# Patient Record
Sex: FEMALE | Race: BLACK OR AFRICAN AMERICAN | Employment: UNEMPLOYED | URBAN - METROPOLITAN AREA
[De-identification: names, ages, dates, MRNs, and addresses within clinical notes are randomized per-mention and may not be internally consistent; named-entity substitution may affect disease eponyms.]

---

## 2018-11-14 ENCOUNTER — HOSPITAL ENCOUNTER (EMERGENCY)
Age: 43
Discharge: HOME | End: 2018-11-14
Attending: EMERGENCY MEDICINE
Payer: MEDICARE

## 2018-11-14 VITALS
WEIGHT: 204.9 LBS | TEMPERATURE: 98.3 F | BODY MASS INDEX: 31.05 KG/M2 | SYSTOLIC BLOOD PRESSURE: 143 MMHG | RESPIRATION RATE: 18 BRPM | DIASTOLIC BLOOD PRESSURE: 89 MMHG | HEART RATE: 94 BPM | HEIGHT: 68 IN | OXYGEN SATURATION: 100 %

## 2018-11-14 DIAGNOSIS — N76.0 BACTERIAL VAGINOSIS: Primary | ICD-10-CM

## 2018-11-14 DIAGNOSIS — B96.89 BACTERIAL VAGINOSIS: Primary | ICD-10-CM

## 2018-11-14 LAB
BILIRUBIN URINE: NEGATIVE
CHP ED QC CHECK: NORMAL
CHP ED QC CHECK: NORMAL
COLOR: YELLOW
COMMENT UA: ABNORMAL
DIRECT EXAM: ABNORMAL
GLUCOSE URINE: NEGATIVE
KETONES, URINE: NEGATIVE
LEUKOCYTE ESTERASE, URINE: NEGATIVE
Lab: ABNORMAL
NITRITE, URINE: NEGATIVE
PH UA: 6.5 (ref 5–8)
PREGNANCY TEST URINE, POC: NEGATIVE
PROTEIN UA: NEGATIVE
SPECIFIC GRAVITY UA: 1 (ref 1–1.03)
SPECIMEN DESCRIPTION: ABNORMAL
STATUS: ABNORMAL
TURBIDITY: CLEAR
URINE HGB: NEGATIVE
UROBILINOGEN, URINE: NORMAL

## 2018-11-14 PROCEDURE — 87660 TRICHOMONAS VAGIN DIR PROBE: CPT

## 2018-11-14 PROCEDURE — 87491 CHLMYD TRACH DNA AMP PROBE: CPT

## 2018-11-14 PROCEDURE — 87591 N.GONORRHOEAE DNA AMP PROB: CPT

## 2018-11-14 PROCEDURE — 99283 EMERGENCY DEPT VISIT LOW MDM: CPT

## 2018-11-14 PROCEDURE — 87480 CANDIDA DNA DIR PROBE: CPT

## 2018-11-14 PROCEDURE — 84703 CHORIONIC GONADOTROPIN ASSAY: CPT

## 2018-11-14 PROCEDURE — 87510 GARDNER VAG DNA DIR PROBE: CPT

## 2018-11-14 PROCEDURE — 81003 URINALYSIS AUTO W/O SCOPE: CPT

## 2018-11-14 RX ORDER — METRONIDAZOLE 500 MG/1
500 TABLET ORAL 2 TIMES DAILY
Qty: 14 TABLET | Refills: 0 | Status: SHIPPED | OUTPATIENT
Start: 2018-11-14 | End: 2018-11-21

## 2018-11-14 RX ORDER — CLONAZEPAM 1 MG/1
1 TABLET ORAL 2 TIMES DAILY PRN
COMMUNITY
Start: 2016-07-01

## 2018-11-14 NOTE — ED NOTES
Pt presents to ED ambulatory with steady gait c/o of pelvic pain for a couple of weeks and vaginal discharge for roughly a week. Pt states discharge is yellow and there is an odor. Pt denies any urinary symptoms, but states she was treated for a UTI and yeast infection prior to pain and discharge starting. Pt denies fever or chills. Pt denies n/v/d/c.       Mignon Ruiz RN  11/14/18 0508

## 2018-11-14 NOTE — ED PROVIDER NOTES
not use drugs. REVIEW OF SYSTEMS    (2-9 systems for level 4, 10 or more for level 5)     REVIEW OF SYSTEMS    Constitutional:  Denies fever, chills, or weakness   HENT:  Denies sore throat or neck pain   Respiratory:  Denies cough or shortness of breath   Cardiovascular:  Denies chest pain, palpitations  GI:  Denies abdominal pain, nausea, vomiting, diarrhea, melena, hematochezia  : Denies urinary frequency, urgency, dysuria, hematuria  GYN: Complains of vaginal discharge. Denies Vaginal bleeding   Musculoskeletal:  Denies back pain   Skin:  Denies rash  : All systems negative except as marked. Except as noted above the remainder of the review of systems was reviewed and negative. PHYSICAL EXAM    (up to 7 for level 4, 8 or more for level 5)     ED Triage Vitals [11/14/18 1456]   BP Temp Temp Source Pulse Resp SpO2 Height Weight   (!) 143/89 98.3 °F (36.8 °C) Oral 94 18 100 % 5' 8\" (1.727 m) 204 lb 14.4 oz (92.9 kg)       General Appearance:  Alert, cooperative, no distress, Pleasant, smiling    Head:  Normocephalic, without obvious abnormality, atraumatic. Eyes:  conjunctiva/corneas clear, EOM's intact. Sclera anicteric. ENT: Mucous membranes moist.    Neck: Supple, symmetrical, trachea midline, no adenopathy. Lungs:    Heart:   Abdomen:    Gynecological:          Extremities:   Pulses:   Skin:   Neurologic:    Respirations eupneic. Lungs CTA  RRR  Soft, nontender  Examination of the external genitalia reveals no rash or lesions. Speculum exam reveals a mild amount of white vaginal discharge. Vaginal rugae pink. No adnexal or cervical motion tenderness. full range of motion. No distal swelling   Radial/ulnar pulses 3+. DP/PT pulses 3+/4   No rashes or lesions to exposed skin. Alert and oriented X 3. Motor grossly normal.  Speech clear.              DIAGNOSTIC RESULTS       RADIOLOGY:   Non-plain film images such as CT, Ultrasound and MRI are read by the radiologist. Estefania Donohue

## 2018-11-15 LAB
C TRACH DNA GENITAL QL NAA+PROBE: NEGATIVE
HCG, PREGNANCY URINE (POC): NEGATIVE
N. GONORRHOEAE DNA: NEGATIVE

## 2019-05-28 ENCOUNTER — APPOINTMENT (OUTPATIENT)
Dept: GENERAL RADIOLOGY | Age: 44
End: 2019-05-28
Payer: MEDICARE

## 2019-05-28 ENCOUNTER — HOSPITAL ENCOUNTER (EMERGENCY)
Age: 44
Discharge: HOME | End: 2019-05-29
Attending: EMERGENCY MEDICINE
Payer: MEDICARE

## 2019-05-28 VITALS
TEMPERATURE: 97.9 F | SYSTOLIC BLOOD PRESSURE: 137 MMHG | DIASTOLIC BLOOD PRESSURE: 86 MMHG | RESPIRATION RATE: 16 BRPM | HEART RATE: 109 BPM | OXYGEN SATURATION: 98 %

## 2019-05-28 DIAGNOSIS — G89.29 CHRONIC PAIN OF RIGHT KNEE: Primary | ICD-10-CM

## 2019-05-28 DIAGNOSIS — M25.561 CHRONIC PAIN OF RIGHT KNEE: Primary | ICD-10-CM

## 2019-05-28 PROCEDURE — 73562 X-RAY EXAM OF KNEE 3: CPT

## 2019-05-28 PROCEDURE — 99283 EMERGENCY DEPT VISIT LOW MDM: CPT

## 2019-05-28 PROCEDURE — 6370000000 HC RX 637 (ALT 250 FOR IP): Performed by: EMERGENCY MEDICINE

## 2019-05-28 RX ORDER — OXYCODONE AND ACETAMINOPHEN 10; 325 MG/1; MG/1
1 TABLET ORAL EVERY 4 HOURS PRN
COMMUNITY
End: 2019-05-29 | Stop reason: ALTCHOICE

## 2019-05-28 RX ORDER — NAPROXEN 250 MG/1
250 TABLET ORAL ONCE
Status: COMPLETED | OUTPATIENT
Start: 2019-05-28 | End: 2019-05-28

## 2019-05-28 RX ORDER — HYDROCODONE BITARTRATE AND ACETAMINOPHEN 5; 325 MG/1; MG/1
1 TABLET ORAL ONCE
Status: COMPLETED | OUTPATIENT
Start: 2019-05-28 | End: 2019-05-28

## 2019-05-28 RX ADMIN — NAPROXEN 250 MG: 250 TABLET ORAL at 23:47

## 2019-05-28 RX ADMIN — HYDROCODONE BITARTRATE AND ACETAMINOPHEN 1 TABLET: 5; 325 TABLET ORAL at 23:47

## 2019-05-28 ASSESSMENT — PAIN SCALES - GENERAL: PAINLEVEL_OUTOF10: 7

## 2019-05-29 RX ORDER — OXYCODONE HYDROCHLORIDE AND ACETAMINOPHEN 5; 325 MG/1; MG/1
1 TABLET ORAL EVERY 6 HOURS PRN
Qty: 7 TABLET | Refills: 0 | Status: SHIPPED | OUTPATIENT
Start: 2019-05-29 | End: 2019-06-01

## 2019-05-29 ASSESSMENT — PAIN SCALES - GENERAL: PAINLEVEL_OUTOF10: 5

## 2019-05-29 NOTE — ED NOTES
Patient comes in from home with right knee pain. Patient states that in October of last year she had injured her knee and but had no other problems until yesterday. Patient has small amount of swelling to right knee. Patient walks to room and is alert and oriented and denies any other complaints at this time.      Marcus Hewitt RN  05/28/19 2756

## 2019-06-02 NOTE — ED PROVIDER NOTES
EMERGENCY DEPARTMENT ENCOUNTER    Pt Name: Everardo Whitman  MRN: [id number]  [place] 1975  Date of evaluation: 6/2/19  CHIEF COMPLAINT       Chief Complaint   Patient presents with    Knee Pain     right knee     HISTORY OF PRESENT ILLNESS   HPI    82-year-old female presenting ED for evaluation of right knee pain. Patient with chronic knee problems since October when she shut a car door on her right knee. Patient here for worsening pain Renée ongoing use with the past 3 weeks. Patient states that this is may be a flare since she hasn't had any new trauma or injury. Patient also has a history of sickle cell anemia but denied any fever chills. Patient doesn't usually get her sickle cell crisis at the knee. Patient denies abdominal pain nausea vomiting. Patient with no dysuria hematuria    REVIEW OF SYSTEMS     Review of Systems   All other systems reviewed and are negative. PASTMEDICAL HISTORY     Past Medical History:   Diagnosis Date    Anxiety     Sickle cell anemia ([facility] [address]      SURGICAL HISTORY       Past Surgical History:   Procedure Laterality Date    CHOLECYSTECTOMY       CURRENT MEDICATIONS       Discharge Medication List as of 5/29/2019 12:48 AM      CONTINUE these medications which have NOT CHANGED    Details   clonazePAM (KLONOPIN) 1 MG tablet Take 1 tablet by mouth 2 times daily. Yashira Kwon Historical Med           ALLERGIES     is allergic to lorazepam and iodides. FAMILY HISTORY     has no family status information on file.       SOCIAL HISTORY       Social History     Tobacco Use    Smoking status: Current Every Day Smoker     Packs/day: 0.50     Types: Cigarettes    Smokeless tobacco: Never Used   Substance Use Topics    Alcohol use: Yes     Comment: occassionally    Drug use: No     PHYSICAL EXAM     INITIAL VITALS:   Vitals:    05/28/19 2119   BP: 137/86   Pulse: 109   Resp: 16   Temp: 97.9 °F (36.6 °C)   TempSrc: Oral   SpO2: 98%       Physical Exam   Constitutional: She is oriented to person, place, and time. She appears well-developed and well-nourished. HENT:   Head: Normocephalic and atraumatic. Eyes: Conjunctivae and EOM are normal.   Neck: Normal range of motion. Neck supple. Cardiovascular: Normal rate and regular rhythm. Pulmonary/Chest: Effort normal and breath sounds normal.   Abdominal: Soft. She exhibits no mass. There is no tenderness. Musculoskeletal: Normal range of motion. She exhibits no edema or deformity. Right knee: She exhibits bony tenderness. She exhibits normal range of motion and no MCL laxity. Tenderness found. Lateral joint line and patellar tendon tenderness noted. Left knee: Normal.        Right ankle: Normal.   Neurological: She is alert and oriented to person, place, and time. Skin: Skin is warm and dry. MEDICAL DECISION MAKING:     Exacerbation of chronic right knee pain. CRITICAL CARE:       PROCEDURES:    Procedures    DIAGNOSTIC RESULTS   EKG:All EKG's are interpreted by the Emergency Department Physician who either signs or Co-signs this chart in the absence of a cardiologist.        RADIOLOGY:All plain film, CT, MRI, and formal ultrasound images (except ED bedside ultrasound) are read by the radiologist, see reports below, unless otherwisenoted in MDM or here. XR KNEE RIGHT (3 VIEWS)   Final Result   1. No acute osseous abnormality. 2. Nonspecific sclerotic lesions in the distal femur and proximal tibia   possibly representing avascular necrosis. Consider further evaluation with   nonemergent CT or MRI. LABS: All lab results were reviewed by myself, and all abnormals are listed below.   Labs Reviewed - No data to display    EMERGENCY DEPARTMENTCOURSE:         Vitals:    Vitals:    05/28/19 2119   BP: 137/86   Pulse: 109   Resp: 16   Temp: 97.9 °F (36.6 °C)   TempSrc: Oral   SpO2: 98%       The patient was given the following medications while in the emergency department:  Orders Placed This Encounter Medications    HYDROcodone-acetaminophen (NORCO) 5-325 MG per tablet 1 tablet    naproxen (NAPROSYN) tablet 250 mg    oxyCODONE-acetaminophen (PERCOCET) 5-325 MG per tablet     Sig: Take 1 tablet by mouth every 6 hours as needed for Pain for up to 3 days. Intended supply: 3 days. Take lowest dose possible to manage pain     Dispense:  7 tablet     Refill:  0     CONSULTS:  None    FINAL IMPRESSION      1. Chronic pain of right knee          DISPOSITION/PLAN   DISPOSITION Decision To Discharge 05/29/2019 12:46:04 AM      PATIENT REFERRED TO:  Ran Huizar MD  [address] [facility] [address] [address] [phone]    Schedule an appointment as soon as possible for a visit in 3 days      Suzanna Oquendo, [address] [place], [place] [address]Chandu Delga[address] [facility]  [phone]    Schedule an appointment as soon as possible for a visit   As needed    DISCHARGE MEDICATIONS:  Discharge Medication List as of 5/29/2019 12:48 AM      START taking these medications    Details   oxyCODONE-acetaminophen (PERCOCET) 5-325 MG per tablet Take 1 tablet by mouth every 6 hours as needed for Pain for up to 3 days. Intended supply: 3 days.  Take lowest dose possible to manage pain, Disp-7 tablet, R-0Print           Shagufta Parks MD  Attending Emergency Physician                    Annie Swanson MD  06/02/19 8445

## 2021-10-13 ENCOUNTER — APPOINTMENT (OUTPATIENT)
Dept: GENERAL RADIOLOGY | Facility: CLINIC | Age: 46
DRG: 662 | End: 2021-10-13
Payer: MEDICARE

## 2021-10-13 ENCOUNTER — APPOINTMENT (OUTPATIENT)
Dept: CT IMAGING | Facility: CLINIC | Age: 46
DRG: 662 | End: 2021-10-13
Payer: MEDICARE

## 2021-10-13 ENCOUNTER — HOSPITAL ENCOUNTER (INPATIENT)
Age: 46
LOS: 3 days | Discharge: ANOTHER HOSPITAL | DRG: 662 | End: 2021-10-16
Attending: EMERGENCY MEDICINE | Admitting: INTERNAL MEDICINE
Payer: MEDICARE

## 2021-10-13 DIAGNOSIS — J18.9 PNEUMONIA OF BOTH LUNGS DUE TO INFECTIOUS ORGANISM, UNSPECIFIED PART OF LUNG: ICD-10-CM

## 2021-10-13 DIAGNOSIS — D64.9 ANEMIA, UNSPECIFIED TYPE: ICD-10-CM

## 2021-10-13 DIAGNOSIS — D57.00 SICKLE CELL CRISIS (HCC): Primary | ICD-10-CM

## 2021-10-13 LAB
ABSOLUTE EOS #: 0 K/UL (ref 0–0.4)
ABSOLUTE EOS #: 0 K/UL (ref 0–0.4)
ABSOLUTE IMMATURE GRANULOCYTE: 0 K/UL (ref 0–0.3)
ABSOLUTE IMMATURE GRANULOCYTE: ABNORMAL K/UL (ref 0–0.3)
ABSOLUTE LYMPH #: 1.46 K/UL (ref 1–4.8)
ABSOLUTE LYMPH #: 1.86 K/UL (ref 1–4.8)
ABSOLUTE MONO #: 0.42 K/UL (ref 0.2–0.8)
ABSOLUTE MONO #: 4.51 K/UL (ref 0.1–0.8)
ABSOLUTE RETIC #: 0.11 M/UL (ref 0.02–0.1)
ALBUMIN SERPL-MCNC: 3.6 G/DL (ref 3.5–5.2)
ALBUMIN/GLOBULIN RATIO: 0.9 (ref 1–2.5)
ALP BLD-CCNC: 134 U/L (ref 35–104)
ALT SERPL-CCNC: 43 U/L (ref 5–33)
AMYLASE: 136 U/L (ref 28–100)
ANION GAP SERPL CALCULATED.3IONS-SCNC: 12 MMOL/L (ref 9–17)
ANION GAP SERPL CALCULATED.3IONS-SCNC: 13 MMOL/L (ref 9–17)
AST SERPL-CCNC: 64 U/L
BASOPHILS # BLD: 0 %
BASOPHILS # BLD: 0 % (ref 0–2)
BASOPHILS ABSOLUTE: 0 K/UL (ref 0–0.2)
BASOPHILS ABSOLUTE: 0 K/UL (ref 0–0.2)
BILIRUB SERPL-MCNC: 7.1 MG/DL (ref 0.3–1.2)
BILIRUBIN DIRECT: 4.26 MG/DL
BILIRUBIN, INDIRECT: 2.84 MG/DL (ref 0–1)
BUN BLDV-MCNC: 20 MG/DL (ref 6–20)
BUN/CREAT BLD: ABNORMAL (ref 9–20)
CALCIUM SERPL-MCNC: 8.6 MG/DL (ref 8.6–10.4)
CHLORIDE BLD-SCNC: 90 MMOL/L (ref 98–107)
CHLORIDE BLD-SCNC: 91 MMOL/L (ref 98–107)
CO2: 22 MMOL/L (ref 20–31)
CO2: 23 MMOL/L (ref 20–31)
CREAT SERPL-MCNC: 0.5 MG/DL (ref 0.5–0.9)
DIFFERENTIAL TYPE: ABNORMAL
DIFFERENTIAL TYPE: ABNORMAL
EOSINOPHILS RELATIVE PERCENT: 0 % (ref 1–4)
EOSINOPHILS RELATIVE PERCENT: 0 % (ref 1–4)
GFR AFRICAN AMERICAN: >60 ML/MIN
GFR NON-AFRICAN AMERICAN: >60 ML/MIN
GFR SERPL CREATININE-BSD FRML MDRD: ABNORMAL ML/MIN/{1.73_M2}
GFR SERPL CREATININE-BSD FRML MDRD: ABNORMAL ML/MIN/{1.73_M2}
GLOBULIN: ABNORMAL G/DL (ref 1.5–3.8)
GLUCOSE BLD-MCNC: 108 MG/DL (ref 70–99)
HCG QUALITATIVE: NEGATIVE
HCT VFR BLD CALC: 18.7 % (ref 36.3–47.1)
HCT VFR BLD CALC: 21.1 % (ref 36–46)
HEMOGLOBIN: 6.6 G/DL (ref 11.9–15.1)
HEMOGLOBIN: 7.2 G/DL (ref 12–16)
IMMATURE GRANULOCYTES: 0 %
IMMATURE GRANULOCYTES: ABNORMAL %
IMMATURE RETIC FRACT: ABNORMAL %
LACTIC ACID, SEPSIS WHOLE BLOOD: NORMAL MMOL/L (ref 0.5–1.9)
LACTIC ACID, SEPSIS: 1.6 MMOL/L (ref 0.5–1.9)
LIPASE: 52 U/L (ref 13–60)
LYMPHOCYTES # BLD: 14 % (ref 24–44)
LYMPHOCYTES # BLD: 7 % (ref 24–44)
MCH RBC QN AUTO: 29.3 PG (ref 25.2–33.5)
MCH RBC QN AUTO: 29.7 PG (ref 26–34)
MCHC RBC AUTO-ENTMCNC: 34 G/DL (ref 31–37)
MCHC RBC AUTO-ENTMCNC: 35.3 G/DL (ref 28.4–34.8)
MCV RBC AUTO: 83.1 FL (ref 82.6–102.9)
MCV RBC AUTO: 87.6 FL (ref 80–100)
METAMYELOCYTES ABSOLUTE COUNT: 0.1 K/UL
METAMYELOCYTES ABSOLUTE COUNT: 0.8 K/UL
METAMYELOCYTES: 1 %
METAMYELOCYTES: 3 %
MONOCYTES # BLD: 17 % (ref 1–7)
MONOCYTES # BLD: 4 % (ref 1–7)
MORPHOLOGY: ABNORMAL
MYELOCYTES ABSOLUTE COUNT: 0.1 K/UL
MYELOCYTES: 1 %
NRBC AUTOMATED: ABNORMAL PER 100 WBC
NRBC AUTOMATED: ABNORMAL PER 100 WBC
NUCLEATED RED BLOOD CELLS: 132 PER 100 WBC
NUCLEATED RED BLOOD CELLS: 87 PER 100 WBC
PDW BLD-RTO: 19.9 % (ref 11.8–14.4)
PDW BLD-RTO: 20.6 % (ref 12.5–15.4)
PLATELET # BLD: 263 K/UL (ref 138–453)
PLATELET # BLD: 269 K/UL (ref 140–450)
PLATELET ESTIMATE: ABNORMAL
PLATELET ESTIMATE: ABNORMAL
PMV BLD AUTO: 8.1 FL (ref 6–12)
PMV BLD AUTO: ABNORMAL FL (ref 6–12)
POTASSIUM SERPL-SCNC: 3.9 MMOL/L (ref 3.7–5.3)
POTASSIUM SERPL-SCNC: 4.2 MMOL/L (ref 3.7–5.3)
RBC # BLD: 2.25 M/UL (ref 3.95–5.11)
RBC # BLD: 2.41 M/UL (ref 4–5.2)
RBC # BLD: ABNORMAL 10*6/UL
RBC # BLD: ABNORMAL 10*6/UL
RETIC %: 4.4 % (ref 0.5–2)
RETIC HEMOGLOBIN: 28.8 PG (ref 28.2–35.7)
SARS-COV-2, RAPID: NOT DETECTED
SEG NEUTROPHILS: 73 % (ref 36–66)
SEG NEUTROPHILS: 80 % (ref 36–66)
SEGMENTED NEUTROPHILS ABSOLUTE COUNT: 19.33 K/UL (ref 1.8–7.7)
SEGMENTED NEUTROPHILS ABSOLUTE COUNT: 8.32 K/UL (ref 1.8–7.7)
SODIUM BLD-SCNC: 125 MMOL/L (ref 135–144)
SODIUM BLD-SCNC: 126 MMOL/L (ref 135–144)
SPECIMEN DESCRIPTION: NORMAL
TOTAL PROTEIN: 7.4 G/DL (ref 6.4–8.3)
WBC # BLD: 10.4 K/UL (ref 3.5–11)
WBC # BLD: 26.5 K/UL (ref 3.5–11)
WBC # BLD: ABNORMAL 10*3/UL
WBC # BLD: ABNORMAL 10*3/UL

## 2021-10-13 PROCEDURE — 6360000002 HC RX W HCPCS: Performed by: INTERNAL MEDICINE

## 2021-10-13 PROCEDURE — 83605 ASSAY OF LACTIC ACID: CPT

## 2021-10-13 PROCEDURE — 6360000002 HC RX W HCPCS: Performed by: EMERGENCY MEDICINE

## 2021-10-13 PROCEDURE — 85025 COMPLETE CBC W/AUTO DIFF WBC: CPT

## 2021-10-13 PROCEDURE — 80076 HEPATIC FUNCTION PANEL: CPT

## 2021-10-13 PROCEDURE — 6370000000 HC RX 637 (ALT 250 FOR IP): Performed by: INTERNAL MEDICINE

## 2021-10-13 PROCEDURE — 86900 BLOOD TYPING SEROLOGIC ABO: CPT

## 2021-10-13 PROCEDURE — 82150 ASSAY OF AMYLASE: CPT

## 2021-10-13 PROCEDURE — 84703 CHORIONIC GONADOTROPIN ASSAY: CPT

## 2021-10-13 PROCEDURE — 85045 AUTOMATED RETICULOCYTE COUNT: CPT

## 2021-10-13 PROCEDURE — 36415 COLL VENOUS BLD VENIPUNCTURE: CPT

## 2021-10-13 PROCEDURE — 87040 BLOOD CULTURE FOR BACTERIA: CPT

## 2021-10-13 PROCEDURE — 83690 ASSAY OF LIPASE: CPT

## 2021-10-13 PROCEDURE — 71250 CT THORAX DX C-: CPT

## 2021-10-13 PROCEDURE — 86901 BLOOD TYPING SEROLOGIC RH(D): CPT

## 2021-10-13 PROCEDURE — 80048 BASIC METABOLIC PNL TOTAL CA: CPT

## 2021-10-13 PROCEDURE — 96374 THER/PROPH/DIAG INJ IV PUSH: CPT

## 2021-10-13 PROCEDURE — 2580000003 HC RX 258: Performed by: INTERNAL MEDICINE

## 2021-10-13 PROCEDURE — 80051 ELECTROLYTE PANEL: CPT

## 2021-10-13 PROCEDURE — 86920 COMPATIBILITY TEST SPIN: CPT

## 2021-10-13 PROCEDURE — 2580000003 HC RX 258: Performed by: EMERGENCY MEDICINE

## 2021-10-13 PROCEDURE — 99285 EMERGENCY DEPT VISIT HI MDM: CPT

## 2021-10-13 PROCEDURE — 87635 SARS-COV-2 COVID-19 AMP PRB: CPT

## 2021-10-13 PROCEDURE — 74176 CT ABD & PELVIS W/O CONTRAST: CPT

## 2021-10-13 PROCEDURE — 86850 RBC ANTIBODY SCREEN: CPT

## 2021-10-13 PROCEDURE — 2060000000 HC ICU INTERMEDIATE R&B

## 2021-10-13 PROCEDURE — 96361 HYDRATE IV INFUSION ADD-ON: CPT

## 2021-10-13 PROCEDURE — 96375 TX/PRO/DX INJ NEW DRUG ADDON: CPT

## 2021-10-13 RX ORDER — DIPHENHYDRAMINE HCL 50 MG
50 CAPSULE ORAL 2 TIMES DAILY PRN
Status: ON HOLD | COMMUNITY
End: 2021-10-16 | Stop reason: HOSPADM

## 2021-10-13 RX ORDER — HYDROMORPHONE HYDROCHLORIDE 1 MG/ML
2 INJECTION, SOLUTION INTRAMUSCULAR; INTRAVENOUS; SUBCUTANEOUS
Status: DISCONTINUED | OUTPATIENT
Start: 2021-10-13 | End: 2021-10-14

## 2021-10-13 RX ORDER — 0.9 % SODIUM CHLORIDE 0.9 %
1000 INTRAVENOUS SOLUTION INTRAVENOUS ONCE
Status: COMPLETED | OUTPATIENT
Start: 2021-10-13 | End: 2021-10-13

## 2021-10-13 RX ORDER — ACETAMINOPHEN 650 MG/1
650 SUPPOSITORY RECTAL EVERY 6 HOURS PRN
Status: DISCONTINUED | OUTPATIENT
Start: 2021-10-13 | End: 2021-10-17 | Stop reason: HOSPADM

## 2021-10-13 RX ORDER — ONDANSETRON 4 MG/1
4 TABLET, ORALLY DISINTEGRATING ORAL EVERY 8 HOURS PRN
Status: DISCONTINUED | OUTPATIENT
Start: 2021-10-13 | End: 2021-10-13

## 2021-10-13 RX ORDER — ONDANSETRON 4 MG/1
4 TABLET, FILM COATED ORAL EVERY 4 HOURS PRN
Status: ON HOLD | COMMUNITY
End: 2021-10-14

## 2021-10-13 RX ORDER — METHYLPREDNISOLONE SODIUM SUCCINATE 125 MG/2ML
125 INJECTION, POWDER, LYOPHILIZED, FOR SOLUTION INTRAMUSCULAR; INTRAVENOUS ONCE
Status: DISCONTINUED | OUTPATIENT
Start: 2021-10-13 | End: 2021-10-13

## 2021-10-13 RX ORDER — MORPHINE SULFATE/0.9% NACL/PF 1 MG/ML
SYRINGE (ML) INJECTION CONTINUOUS
Status: ON HOLD | COMMUNITY
End: 2021-10-14

## 2021-10-13 RX ORDER — ONDANSETRON 2 MG/ML
4 INJECTION INTRAMUSCULAR; INTRAVENOUS EVERY 6 HOURS PRN
Status: DISCONTINUED | OUTPATIENT
Start: 2021-10-13 | End: 2021-10-13

## 2021-10-13 RX ORDER — MORPHINE SULFATE 30 MG/1
30 TABLET ORAL 2 TIMES DAILY
Status: ON HOLD | COMMUNITY
End: 2021-10-14

## 2021-10-13 RX ORDER — ACETAMINOPHEN 325 MG/1
650 TABLET ORAL EVERY 6 HOURS PRN
Status: DISCONTINUED | OUTPATIENT
Start: 2021-10-13 | End: 2021-10-17 | Stop reason: HOSPADM

## 2021-10-13 RX ORDER — HYDROMORPHONE HYDROCHLORIDE 1 MG/ML
1 INJECTION, SOLUTION INTRAMUSCULAR; INTRAVENOUS; SUBCUTANEOUS
Status: DISCONTINUED | OUTPATIENT
Start: 2021-10-13 | End: 2021-10-13

## 2021-10-13 RX ORDER — SODIUM CHLORIDE 9 MG/ML
INJECTION, SOLUTION INTRAVENOUS PRN
Status: COMPLETED | OUTPATIENT
Start: 2021-10-13 | End: 2021-10-15

## 2021-10-13 RX ORDER — PANTOPRAZOLE SODIUM 40 MG/10ML
40 INJECTION, POWDER, LYOPHILIZED, FOR SOLUTION INTRAVENOUS DAILY
Status: DISCONTINUED | OUTPATIENT
Start: 2021-10-14 | End: 2021-10-17 | Stop reason: HOSPADM

## 2021-10-13 RX ORDER — DIPHENHYDRAMINE HYDROCHLORIDE 50 MG/ML
25 INJECTION INTRAMUSCULAR; INTRAVENOUS ONCE
Status: COMPLETED | OUTPATIENT
Start: 2021-10-13 | End: 2021-10-13

## 2021-10-13 RX ORDER — DIPHENHYDRAMINE HCL 25 MG
25 TABLET ORAL EVERY 6 HOURS PRN
Status: DISCONTINUED | OUTPATIENT
Start: 2021-10-13 | End: 2021-10-17 | Stop reason: HOSPADM

## 2021-10-13 RX ORDER — SODIUM CHLORIDE 0.9 % (FLUSH) 0.9 %
5-40 SYRINGE (ML) INJECTION EVERY 12 HOURS SCHEDULED
Status: DISCONTINUED | OUTPATIENT
Start: 2021-10-13 | End: 2021-10-17 | Stop reason: HOSPADM

## 2021-10-13 RX ORDER — OXYCODONE HCL 20 MG/1
TABLET, FILM COATED, EXTENDED RELEASE ORAL EVERY 4 HOURS
Status: ON HOLD | COMMUNITY
End: 2021-10-14

## 2021-10-13 RX ORDER — SODIUM CHLORIDE 0.9 % (FLUSH) 0.9 %
5-40 SYRINGE (ML) INJECTION PRN
Status: DISCONTINUED | OUTPATIENT
Start: 2021-10-13 | End: 2021-10-17 | Stop reason: HOSPADM

## 2021-10-13 RX ORDER — MORPHINE SULFATE 15 MG/1
30 TABLET, FILM COATED, EXTENDED RELEASE ORAL 2 TIMES DAILY
Status: DISCONTINUED | OUTPATIENT
Start: 2021-10-13 | End: 2021-10-17 | Stop reason: HOSPADM

## 2021-10-13 RX ORDER — HYDROMORPHONE HYDROCHLORIDE 1 MG/ML
1 INJECTION, SOLUTION INTRAMUSCULAR; INTRAVENOUS; SUBCUTANEOUS ONCE
Status: DISCONTINUED | OUTPATIENT
Start: 2021-10-13 | End: 2021-10-13

## 2021-10-13 RX ORDER — SODIUM CHLORIDE 9 MG/ML
25 INJECTION, SOLUTION INTRAVENOUS PRN
Status: DISCONTINUED | OUTPATIENT
Start: 2021-10-13 | End: 2021-10-17 | Stop reason: HOSPADM

## 2021-10-13 RX ORDER — ONDANSETRON 2 MG/ML
4 INJECTION INTRAMUSCULAR; INTRAVENOUS EVERY 4 HOURS PRN
Status: DISCONTINUED | OUTPATIENT
Start: 2021-10-13 | End: 2021-10-17 | Stop reason: HOSPADM

## 2021-10-13 RX ORDER — SODIUM CHLORIDE 9 MG/ML
INJECTION, SOLUTION INTRAVENOUS CONTINUOUS
Status: DISCONTINUED | OUTPATIENT
Start: 2021-10-13 | End: 2021-10-17 | Stop reason: HOSPADM

## 2021-10-13 RX ORDER — POLYETHYLENE GLYCOL 3350 17 G/17G
17 POWDER, FOR SOLUTION ORAL DAILY PRN
Status: DISCONTINUED | OUTPATIENT
Start: 2021-10-13 | End: 2021-10-17 | Stop reason: HOSPADM

## 2021-10-13 RX ORDER — HYDROMORPHONE HYDROCHLORIDE 1 MG/ML
1 INJECTION, SOLUTION INTRAMUSCULAR; INTRAVENOUS; SUBCUTANEOUS ONCE
Status: COMPLETED | OUTPATIENT
Start: 2021-10-13 | End: 2021-10-13

## 2021-10-13 RX ADMIN — CEFTRIAXONE SODIUM 1000 MG: 1 INJECTION, POWDER, FOR SOLUTION INTRAMUSCULAR; INTRAVENOUS at 16:23

## 2021-10-13 RX ADMIN — DIPHENHYDRAMINE HYDROCHLORIDE 25 MG: 25 TABLET ORAL at 21:56

## 2021-10-13 RX ADMIN — HYDROMORPHONE HYDROCHLORIDE 1 MG: 1 INJECTION, SOLUTION INTRAMUSCULAR; INTRAVENOUS; SUBCUTANEOUS at 14:04

## 2021-10-13 RX ADMIN — SODIUM CHLORIDE 1000 ML: 9 INJECTION, SOLUTION INTRAVENOUS at 13:44

## 2021-10-13 RX ADMIN — AZITHROMYCIN MONOHYDRATE 500 MG: 500 INJECTION, POWDER, LYOPHILIZED, FOR SOLUTION INTRAVENOUS at 17:16

## 2021-10-13 RX ADMIN — MORPHINE SULFATE 30 MG: 15 TABLET, FILM COATED, EXTENDED RELEASE ORAL at 21:56

## 2021-10-13 RX ADMIN — HYDROMORPHONE HYDROCHLORIDE 1 MG: 1 INJECTION, SOLUTION INTRAMUSCULAR; INTRAVENOUS; SUBCUTANEOUS at 21:56

## 2021-10-13 RX ADMIN — SODIUM CHLORIDE: 9 INJECTION, SOLUTION INTRAVENOUS at 21:13

## 2021-10-13 RX ADMIN — DIPHENHYDRAMINE HYDROCHLORIDE 25 MG: 50 INJECTION, SOLUTION INTRAMUSCULAR; INTRAVENOUS at 14:02

## 2021-10-13 RX ADMIN — HYDROMORPHONE HYDROCHLORIDE 1 MG: 1 INJECTION, SOLUTION INTRAMUSCULAR; INTRAVENOUS; SUBCUTANEOUS at 17:16

## 2021-10-13 RX ADMIN — HYDROMORPHONE HYDROCHLORIDE 1 MG: 1 INJECTION, SOLUTION INTRAMUSCULAR; INTRAVENOUS; SUBCUTANEOUS at 21:07

## 2021-10-13 RX ADMIN — ONDANSETRON 4 MG: 2 INJECTION INTRAMUSCULAR; INTRAVENOUS at 21:57

## 2021-10-13 ASSESSMENT — PAIN SCALES - GENERAL
PAINLEVEL_OUTOF10: 10

## 2021-10-13 NOTE — ED NOTES
PT taking off oxygen again 02 sat 88% room air/ pt encouraged to leave nasal cannula on .  Pt is alert and oriented x 68 Crystal Lira RN  10/13/21 9349

## 2021-10-13 NOTE — ED NOTES
Ice pack applied to left upper arm / swelling / edema noted. . Pt removed nasal cannula .  Encouraged pt to remain on oxygen      Wong Dumont RN  10/13/21 7766

## 2021-10-13 NOTE — ED NOTES
Pt states she gets mild  Hives with CT scan but does well with pre-medication of benadryl     Maryanne Odom RN  10/13/21 9504

## 2021-10-13 NOTE — ED PROVIDER NOTES
[facility]  [address] [facility]  Phone: [phone] [address]      Pt Name: Latonia Vines  MRN: [id number]  [place] 1975  Date of evaluation: 10/13/2021    CHIEF COMPLAINT       Chief Complaint   Patient presents with    Extremity Weakness     pt states she has not been able to walk in 5 days. HISTORY OF PRESENT ILLNESS    Latonia Vines is a 42 y.o. female who presents to the emergency department with generalized weakness and difficulty walking for the past 5 days. History of sickle cell anemia typically goes to [facility] for sickle cell pain. Denies any chest pain or shortness of breath currently. Admits to nausea without vomiting. No abdominal pain. Complaining primarily of pain and weakness/numbness in her legs    REVIEW OF SYSTEMS       Constitutional: No fevers or chills positive fatigue  HEENT: No sore throat, rhinorrhea, or earache   Eyes: No blurry vision or double vision no drainage   Cardiovascular: No chest pain or tachycardia   Respiratory: No wheezing or shortness of breath no cough   Gastrointestinal: Positive nausea no vomiting, diarrhea, constipation, or abdominal pain   : No hematuria or dysuria   Musculoskeletal: No swelling or pain   Skin: No rash   Neurological: Bilateral lower extremity paresthesias no headache    PAST MEDICAL HISTORY    has a past medical history of Anxiety and Sickle cell anemia ([facility] [address] SURGICAL HISTORY      has a past surgical history that includes Cholecystectomy. CURRENT MEDICATIONS       Previous Medications    CLONAZEPAM (KLONOPIN) 1 MG TABLET    Take 1 tablet by mouth 2 times daily. .    MORPHINE (MSIR) 30 MG TABLET    Take 30 mg by mouth every 4 hours as needed for Pain. MORPHINE 1 MG/ML SOLN INJECTION    Infuse intravenously continuous. OXYCODONE (OXYCONTIN) 20 MG EXTENDED RELEASE TABLET    Take by mouth every 4 hours.        ALLERGIES     is allergic to lorazepam and iodides. FAMILY HISTORY     has no family status information on file. family history is not on file. SOCIAL HISTORY      reports that she has been smoking cigarettes. She has been smoking about 0.50 packs per day. She has never used smokeless tobacco. She reports current alcohol use. She reports current drug use. Drug: Opiates . PHYSICAL EXAM       ED Triage Vitals [10/13/21 1239]   BP Temp Temp src Pulse Resp SpO2 Height Weight   120/78 99.4 °F (37.4 °C) -- 96 17 (!) 88 % 5' 7\" (1.702 m) 176 lb (79.8 kg)       Constitutional: Alert, oriented x3, nontoxic, answering questions appropriately, acting properly for age, in no acute distress   HEENT: Extraocular muscles intact, mucus membranes moist, scleral icterus no posterior pharyngeal erythema or exudates, Pupils equal, round, reactive to light,   Neck: Trachea midline   Cardiovascular: Regular rhythm and rate no S3, S4, or murmurs   Respiratory: Clear to auscultation bilaterally no wheezes, rhonchi, rales, no respiratory distress no tachypnea no retractions no hypoxia  Gastrointestinal: Soft, nontender, nondistended, diminished bowel sounds. No rebound, rigidity, or guarding. Musculoskeletal: No extremity pain or swelling   Neurologic: Moving all 4 extremities without difficulty there are no gross focal neurologic deficits   Skin: Warm and dry       DIFFERENTIAL DIAGNOSIS/ MDM:     IV labs. Fluids. Pain control. DIAGNOSTIC RESULTS     EKG: All EKG's are interpreted by the Emergency Department Physician who either signs or Co-signs this chart in the absence of a cardiologist.        Not indicated unless otherwise documented above    LABS:  Results for orders placed or performed during the hospital encounter of 10/13/21   COVID-19, Rapid    Specimen: Nasopharyngeal Swab   Result Value Ref Range    Specimen Description . NASOPHARYNGEAL SWAB     SARS-CoV-2, Rapid Not Detected Not Detected   CBC Auto Differential   Result Value Ref Range    WBC 26.5 (H) 3.5 - 11.0 k/uL    RBC 2.41 (L) 4.0 - 5.2 m/uL    Hemoglobin 7.2 (L) 12.0 - 16.0 g/dL    Hematocrit 21.1 (L) 36 - 46 %    MCV 87.6 80 - 100 fL    MCH 29.7 26 - 34 pg    MCHC 34.0 31 - 37 g/dL    RDW 20.6 (H) 12.5 - 15.4 %    Platelets 346 410 - 773 k/uL    MPV 8.1 6.0 - 12.0 fL    NRBC Automated NOT REPORTED per 100 WBC    Differential Type NOT REPORTED     Immature Granulocytes NOT REPORTED 0 %    Absolute Immature Granulocyte NOT REPORTED 0.00 - 0.30 k/uL    WBC Morphology NOT REPORTED     RBC Morphology NOT REPORTED     Platelet Estimate NOT REPORTED     Seg Neutrophils 73 (H) 36 - 66 %    Lymphocytes 7 (L) 24 - 44 %    Monocytes 17 (H) 1 - 7 %    Eosinophils % 0 (L) 1 - 4 %    Basophils 0 0 - 2 %    Metamyelocytes 3 (H) 0 %    nRBC 87 (H) 0 per 100 WBC    Segs Absolute 19.33 (H) 1.8 - 7.7 k/uL    Absolute Lymph # 1.86 1.0 - 4.8 k/uL    Absolute Mono # 4.51 (H) 0.1 - 0.8 k/uL    Absolute Eos # 0.00 0.0 - 0.4 k/uL    Basophils Absolute 0.00 0.0 - 0.2 k/uL    Metamyelocytes Absolute 0.80 (H) 0 k/uL    Morphology ANISOCYTOSIS PRESENT     Morphology TARGET CELLS     Morphology 1+ POLYCHROMASIA     Morphology SICKLE CELLS PRESENT     Morphology PAPPENHEIMER BODIES PRESENT    Basic Metabolic Panel   Result Value Ref Range    Glucose 108 (H) 70 - 99 mg/dL    BUN 20 6 - 20 mg/dL    CREATININE 0.50 0.50 - 0.90 mg/dL    Bun/Cre Ratio NOT REPORTED 9 - 20    Calcium 8.6 8.6 - 10.4 mg/dL    Sodium 126 (L) 135 - 144 mmol/L    Potassium 4.2 3.7 - 5.3 mmol/L    Chloride 90 (L) 98 - 107 mmol/L    CO2 23 20 - 31 mmol/L    Anion Gap 13 9 - 17 mmol/L    GFR Non-African American >60 >60 mL/min    GFR African American >60 >60 mL/min    GFR Comment          GFR Staging NOT REPORTED    Amylase   Result Value Ref Range    Amylase 136 (H) 28 - 100 U/L   Lipase   Result Value Ref Range    Lipase 52 13 - 60 U/L   Hepatic Function Panel   Result Value Ref Range    Albumin 3.6 3.5 - 5.2 g/dL    Alkaline Phosphatase 134 (H) 35 - 104 U/L bolus    HYDROmorphone (DILAUDID) injection 1 mg    diphenhydrAMINE (BENADRYL) injection 25 mg    DISCONTD: methylPREDNISolone sodium (SOLU-MEDROL) injection 125 mg    cefTRIAXone (ROCEPHIN) 1,000 mg in sterile water 10 mL IV syringe     Order Specific Question:   Antimicrobial Indications     Answer:   Pneumonia (CAP)    azithromycin (ZITHROMAX) 500 mg in D5W 250ml addavial     Order Specific Question:   Antimicrobial Indications     Answer:   Pneumonia (CAP)    HYDROmorphone (DILAUDID) injection 1 mg        Vitals:   -------------------------  BP (!) 138/93   Pulse 97   Temp 97 °F (36.1 °C)   Resp 28   Ht 5' 7\" (1.702 m)   Wt 79.8 kg (176 lb)   SpO2 96%   BMI 27.57 kg/m²     1:45 PM will address pain. White blood cell count 26.5 hemoglobin is 7.2. Reticulocyte count pending. Lipase normal liver enzymes slightly elevated with an alk phos of 134 ALT of 43 and AST of 64 with a total bilirubin of 7.1. Compared to previous labs from [facility] back in August those numbers were normal.  We will obtain a CT of the abdomen and pelvis and a chest x-ray. Patient will require admission. [facility] currently closed to transfers. 3:05 PM CT of the abdomen unremarkable with exception of trace loculated right pleural effusion and airspace consolidation suggesting pneumonia. CT of the chest demonstrates extensive infiltrates on the right. Will get blood cultures and start IV antibiotics. Patient resting comfortably no acute distress. 3:35 PM IV went subcutaneous. Will attempt other IV site. Patient will require IV antibiotics and admission agreeable to transfer to [facility]. 4:40 PM discussed with Dr. Jose Uribe for Dr. Aida Ridley who agrees to admission. Awaiting bed assignment and transport. 5:50 PM lost another IV. She was able to get her antibiotics. Patient will most likely need a PICC line. Hemodynamically stable at this time. 605 bed assignment given. Transport in route.     CRITICAL

## 2021-10-13 NOTE — ED NOTES
3 rd IV ultrasound IV - infiltrated. LUE swelling noted. Called Ethan to see time of admission . Dr. Bernadette Corbin notified.       Ankit Angel, RN  10/13/21 Õie 16, RN  10/13/21 2052

## 2021-10-13 NOTE — ED NOTES
Pt pulled out LAC IV c/o swelling.  At 1752 / arm was assessed and pt was notified that writer will be in the room to pull line and restart IV     Aman Nowak, RN  10/13/21 [facility] [address] RN  10/13/21 1104

## 2021-10-13 NOTE — ED NOTES
Andrea Ag called for transport [address] [facility]      Jaja De La Garza, [address] [facility]  10/13/21 3752

## 2021-10-13 NOTE — ED NOTES
Pt pulling off oxygen tubing/ encourage pt to keep tubing on - due to pt desats 88% room air     Wagner Patrick RN  10/13/21 4429

## 2021-10-13 NOTE — ED NOTES
CALLED [facility]. RN unavailable to take report. [facility] states night nurse is not here yet.       Long Hernandez RN  10/13/21 0585

## 2021-10-13 NOTE — ED NOTES
[facility] called -    NO available transport  Wagner Patrick, RN  10/13/21 [address], [address] [place] [address]  10/13/21 2109

## 2021-10-14 ENCOUNTER — APPOINTMENT (OUTPATIENT)
Dept: GENERAL RADIOLOGY | Age: 46
DRG: 662 | End: 2021-10-14
Payer: MEDICARE

## 2021-10-14 LAB
ABSOLUTE EOS #: 0 K/UL (ref 0–0.44)
ABSOLUTE IMMATURE GRANULOCYTE: 1.1 K/UL (ref 0–0.3)
ABSOLUTE LYMPH #: 1.97 K/UL (ref 1.1–3.7)
ABSOLUTE MONO #: 2.19 K/UL (ref 0.1–1.2)
AFP: 2.4 UG/L
ALBUMIN SERPL-MCNC: 3.1 G/DL (ref 3.5–5.2)
ALBUMIN/GLOBULIN RATIO: ABNORMAL (ref 1–2.5)
ALP BLD-CCNC: 139 U/L (ref 35–104)
ALPHA-1 ANTITRYPSIN: 380 MG/DL (ref 90–200)
ALT SERPL-CCNC: 40 U/L (ref 5–33)
ANION GAP SERPL CALCULATED.3IONS-SCNC: 13 MMOL/L (ref 9–17)
AST SERPL-CCNC: 56 U/L
BASOPHILS # BLD: 0 % (ref 0–2)
BASOPHILS ABSOLUTE: 0 K/UL (ref 0–0.2)
BILIRUB SERPL-MCNC: 6.65 MG/DL (ref 0.3–1.2)
BUN BLDV-MCNC: 9 MG/DL (ref 6–20)
BUN/CREAT BLD: ABNORMAL (ref 9–20)
CALCIUM SERPL-MCNC: 7.9 MG/DL (ref 8.6–10.4)
CERULOPLASMIN: 47 MG/DL (ref 16–45)
CHLORIDE BLD-SCNC: 96 MMOL/L (ref 98–107)
CO2: 19 MMOL/L (ref 20–31)
CREAT SERPL-MCNC: <0.4 MG/DL (ref 0.5–0.9)
DIFFERENTIAL TYPE: ABNORMAL
EOSINOPHILS RELATIVE PERCENT: 0 % (ref 1–4)
FERRITIN: 2830 UG/L (ref 13–150)
FOLATE: 13.6 NG/ML
GFR AFRICAN AMERICAN: ABNORMAL ML/MIN
GFR NON-AFRICAN AMERICAN: ABNORMAL ML/MIN
GFR SERPL CREATININE-BSD FRML MDRD: ABNORMAL ML/MIN/{1.73_M2}
GFR SERPL CREATININE-BSD FRML MDRD: ABNORMAL ML/MIN/{1.73_M2}
GLUCOSE BLD-MCNC: 101 MG/DL (ref 70–99)
HAV IGM SER IA-ACNC: NONREACTIVE
HCT VFR BLD CALC: 22 % (ref 36.3–47.1)
HEMOGLOBIN: 7.8 G/DL (ref 11.9–15.1)
HEPATITIS B CORE IGM ANTIBODY: NONREACTIVE
HEPATITIS B SURFACE ANTIGEN: NONREACTIVE
HEPATITIS C ANTIBODY: NONREACTIVE
IGA: 226 MG/DL (ref 70–400)
IGM: 54 MG/DL (ref 40–230)
IMMATURE GRANULOCYTES: 5 %
IRON SATURATION: 20 % (ref 20–55)
IRON: 45 UG/DL (ref 37–145)
LV EF: 70 %
LVEF MODALITY: NORMAL
LYMPHOCYTES # BLD: 9 % (ref 24–43)
MCH RBC QN AUTO: 29.4 PG (ref 25.2–33.5)
MCHC RBC AUTO-ENTMCNC: 35.5 G/DL (ref 28.4–34.8)
MCV RBC AUTO: 83 FL (ref 82.6–102.9)
MONOCYTES # BLD: 10 % (ref 3–12)
MORPHOLOGY: ABNORMAL
NRBC AUTOMATED: 68.8 PER 100 WBC
NUCLEATED RED BLOOD CELLS: 69 PER 100 WBC
PDW BLD-RTO: 19.3 % (ref 11.8–14.4)
PLATELET # BLD: 283 K/UL (ref 138–453)
PLATELET ESTIMATE: ABNORMAL
PMV BLD AUTO: 10.3 FL (ref 8.1–13.5)
POTASSIUM SERPL-SCNC: 3.6 MMOL/L (ref 3.7–5.3)
PROCALCITONIN: 2.18 NG/ML
RBC # BLD: 2.65 M/UL (ref 3.95–5.11)
RBC # BLD: ABNORMAL 10*6/UL
SEG NEUTROPHILS: 76 % (ref 36–65)
SEGMENTED NEUTROPHILS ABSOLUTE COUNT: 16.64 K/UL (ref 1.5–8.1)
SODIUM BLD-SCNC: 128 MMOL/L (ref 135–144)
TOTAL IRON BINDING CAPACITY: 226 UG/DL (ref 250–450)
TOTAL PROTEIN: 6.8 G/DL (ref 6.4–8.3)
UNSATURATED IRON BINDING CAPACITY: 181 UG/DL (ref 112–347)
VITAMIN B-12: 392 PG/ML (ref 232–1245)
WBC # BLD: 21.9 K/UL (ref 3.5–11.3)
WBC # BLD: ABNORMAL 10*3/UL

## 2021-10-14 PROCEDURE — C9113 INJ PANTOPRAZOLE SODIUM, VIA: HCPCS | Performed by: INTERNAL MEDICINE

## 2021-10-14 PROCEDURE — 2580000003 HC RX 258: Performed by: NURSE PRACTITIONER

## 2021-10-14 PROCEDURE — 2500000003 HC RX 250 WO HCPCS: Performed by: INTERNAL MEDICINE

## 2021-10-14 PROCEDURE — 83516 IMMUNOASSAY NONANTIBODY: CPT

## 2021-10-14 PROCEDURE — 94761 N-INVAS EAR/PLS OXIMETRY MLT: CPT

## 2021-10-14 PROCEDURE — 80053 COMPREHEN METABOLIC PANEL: CPT

## 2021-10-14 PROCEDURE — 86225 DNA ANTIBODY NATIVE: CPT

## 2021-10-14 PROCEDURE — 82103 ALPHA-1-ANTITRYPSIN TOTAL: CPT

## 2021-10-14 PROCEDURE — 93306 TTE W/DOPPLER COMPLETE: CPT

## 2021-10-14 PROCEDURE — 86900 BLOOD TYPING SEROLOGIC ABO: CPT

## 2021-10-14 PROCEDURE — 6370000000 HC RX 637 (ALT 250 FOR IP): Performed by: INTERNAL MEDICINE

## 2021-10-14 PROCEDURE — 86665 EPSTEIN-BARR CAPSID VCA: CPT

## 2021-10-14 PROCEDURE — 99254 IP/OBS CNSLTJ NEW/EST MOD 60: CPT | Performed by: INTERNAL MEDICINE

## 2021-10-14 PROCEDURE — P9016 RBC LEUKOCYTES REDUCED: HCPCS

## 2021-10-14 PROCEDURE — 84145 PROCALCITONIN (PCT): CPT

## 2021-10-14 PROCEDURE — 94640 AIRWAY INHALATION TREATMENT: CPT

## 2021-10-14 PROCEDURE — 6360000002 HC RX W HCPCS: Performed by: INTERNAL MEDICINE

## 2021-10-14 PROCEDURE — 0202U NFCT DS 22 TRGT SARS-COV-2: CPT

## 2021-10-14 PROCEDURE — 2580000003 HC RX 258: Performed by: INTERNAL MEDICINE

## 2021-10-14 PROCEDURE — 6370000000 HC RX 637 (ALT 250 FOR IP): Performed by: NURSE PRACTITIONER

## 2021-10-14 PROCEDURE — 36430 TRANSFUSION BLD/BLD COMPNT: CPT

## 2021-10-14 PROCEDURE — 82784 ASSAY IGA/IGD/IGG/IGM EACH: CPT

## 2021-10-14 PROCEDURE — 85025 COMPLETE CBC W/AUTO DIFF WBC: CPT

## 2021-10-14 PROCEDURE — 86663 EPSTEIN-BARR ANTIBODY: CPT

## 2021-10-14 PROCEDURE — 86644 CMV ANTIBODY: CPT

## 2021-10-14 PROCEDURE — 71045 X-RAY EXAM CHEST 1 VIEW: CPT

## 2021-10-14 PROCEDURE — 82390 ASSAY OF CERULOPLASMIN: CPT

## 2021-10-14 PROCEDURE — 83540 ASSAY OF IRON: CPT

## 2021-10-14 PROCEDURE — 86645 CMV ANTIBODY IGM: CPT

## 2021-10-14 PROCEDURE — 83550 IRON BINDING TEST: CPT

## 2021-10-14 PROCEDURE — 36415 COLL VENOUS BLD VENIPUNCTURE: CPT

## 2021-10-14 PROCEDURE — 86038 ANTINUCLEAR ANTIBODIES: CPT

## 2021-10-14 PROCEDURE — 2700000000 HC OXYGEN THERAPY PER DAY

## 2021-10-14 PROCEDURE — 82728 ASSAY OF FERRITIN: CPT

## 2021-10-14 PROCEDURE — 82105 ALPHA-FETOPROTEIN SERUM: CPT

## 2021-10-14 PROCEDURE — 6360000002 HC RX W HCPCS: Performed by: NURSE PRACTITIONER

## 2021-10-14 PROCEDURE — 82746 ASSAY OF FOLIC ACID SERUM: CPT

## 2021-10-14 PROCEDURE — 99255 IP/OBS CONSLTJ NEW/EST HI 80: CPT | Performed by: INTERNAL MEDICINE

## 2021-10-14 PROCEDURE — 82607 VITAMIN B-12: CPT

## 2021-10-14 PROCEDURE — APPNB30 APP NON BILLABLE TIME 0-30 MINS: Performed by: NURSE PRACTITIONER

## 2021-10-14 PROCEDURE — 86664 EPSTEIN-BARR NUCLEAR ANTIGEN: CPT

## 2021-10-14 PROCEDURE — 86376 MICROSOMAL ANTIBODY EACH: CPT

## 2021-10-14 PROCEDURE — 2060000000 HC ICU INTERMEDIATE R&B

## 2021-10-14 PROCEDURE — 80074 ACUTE HEPATITIS PANEL: CPT

## 2021-10-14 RX ORDER — IBUPROFEN 600 MG/1
600 TABLET ORAL EVERY 6 HOURS PRN
Status: ON HOLD | COMMUNITY
End: 2021-10-16 | Stop reason: HOSPADM

## 2021-10-14 RX ORDER — NALOXONE HYDROCHLORIDE 0.4 MG/ML
0.4 INJECTION, SOLUTION INTRAMUSCULAR; INTRAVENOUS; SUBCUTANEOUS PRN
Status: DISCONTINUED | OUTPATIENT
Start: 2021-10-14 | End: 2021-10-17 | Stop reason: HOSPADM

## 2021-10-14 RX ORDER — ALBUTEROL SULFATE 90 UG/1
2 AEROSOL, METERED RESPIRATORY (INHALATION) EVERY 4 HOURS PRN
Status: DISCONTINUED | OUTPATIENT
Start: 2021-10-14 | End: 2021-10-17 | Stop reason: HOSPADM

## 2021-10-14 RX ORDER — HYDROMORPHONE HYDROCHLORIDE 1 MG/ML
1 INJECTION, SOLUTION INTRAMUSCULAR; INTRAVENOUS; SUBCUTANEOUS ONCE
Status: COMPLETED | OUTPATIENT
Start: 2021-10-14 | End: 2021-10-14

## 2021-10-14 RX ORDER — MORPHINE SULFATE 30 MG/1
30 TABLET, FILM COATED, EXTENDED RELEASE ORAL 2 TIMES DAILY
COMMUNITY

## 2021-10-14 RX ORDER — NALOXONE HYDROCHLORIDE 4 MG/.1ML
1 SPRAY NASAL PRN
COMMUNITY

## 2021-10-14 RX ORDER — ONDANSETRON 4 MG/1
4 TABLET, ORALLY DISINTEGRATING ORAL EVERY 8 HOURS PRN
COMMUNITY

## 2021-10-14 RX ORDER — BUDESONIDE AND FORMOTEROL FUMARATE DIHYDRATE 160; 4.5 UG/1; UG/1
2 AEROSOL RESPIRATORY (INHALATION) 2 TIMES DAILY
Status: DISCONTINUED | OUTPATIENT
Start: 2021-10-14 | End: 2021-10-17 | Stop reason: HOSPADM

## 2021-10-14 RX ORDER — OXYCODONE HYDROCHLORIDE 20 MG/1
40-60 TABLET ORAL EVERY 4 HOURS PRN
Status: ON HOLD | COMMUNITY
End: 2021-10-16 | Stop reason: HOSPADM

## 2021-10-14 RX ADMIN — MORPHINE SULFATE 30 MG: 15 TABLET, FILM COATED, EXTENDED RELEASE ORAL at 07:56

## 2021-10-14 RX ADMIN — DIPHENHYDRAMINE HYDROCHLORIDE 25 MG: 25 TABLET ORAL at 05:58

## 2021-10-14 RX ADMIN — Medication 12 MG: at 18:24

## 2021-10-14 RX ADMIN — ONDANSETRON 4 MG: 2 INJECTION INTRAMUSCULAR; INTRAVENOUS at 03:17

## 2021-10-14 RX ADMIN — AZITHROMYCIN MONOHYDRATE 500 MG: 500 INJECTION, POWDER, LYOPHILIZED, FOR SOLUTION INTRAVENOUS at 17:36

## 2021-10-14 RX ADMIN — HYDROMORPHONE HYDROCHLORIDE 2 MG: 1 INJECTION, SOLUTION INTRAMUSCULAR; INTRAVENOUS; SUBCUTANEOUS at 05:58

## 2021-10-14 RX ADMIN — SODIUM CHLORIDE: 9 INJECTION, SOLUTION INTRAVENOUS at 00:16

## 2021-10-14 RX ADMIN — BUDESONIDE AND FORMOTEROL FUMARATE DIHYDRATE 2 PUFF: 160; 4.5 AEROSOL RESPIRATORY (INHALATION) at 19:40

## 2021-10-14 RX ADMIN — HYDROMORPHONE HYDROCHLORIDE 2 MG: 1 INJECTION, SOLUTION INTRAMUSCULAR; INTRAVENOUS; SUBCUTANEOUS at 03:17

## 2021-10-14 RX ADMIN — ONDANSETRON 4 MG: 2 INJECTION INTRAMUSCULAR; INTRAVENOUS at 14:24

## 2021-10-14 RX ADMIN — HYDROMORPHONE HYDROCHLORIDE 2 MG: 1 INJECTION, SOLUTION INTRAMUSCULAR; INTRAVENOUS; SUBCUTANEOUS at 00:15

## 2021-10-14 RX ADMIN — DIPHENHYDRAMINE HYDROCHLORIDE 25 MG: 25 TABLET ORAL at 20:21

## 2021-10-14 RX ADMIN — SODIUM CHLORIDE: 9 INJECTION, SOLUTION INTRAVENOUS at 17:34

## 2021-10-14 RX ADMIN — DIPHENHYDRAMINE HYDROCHLORIDE 25 MG: 25 TABLET ORAL at 14:24

## 2021-10-14 RX ADMIN — HYDROMORPHONE HYDROCHLORIDE 1 MG: 1 INJECTION, SOLUTION INTRAMUSCULAR; INTRAVENOUS; SUBCUTANEOUS at 15:27

## 2021-10-14 RX ADMIN — PANTOPRAZOLE SODIUM 40 MG: 40 INJECTION, POWDER, FOR SOLUTION INTRAVENOUS at 07:58

## 2021-10-14 RX ADMIN — Medication 12 MG: at 09:02

## 2021-10-14 RX ADMIN — MORPHINE SULFATE 30 MG: 15 TABLET, FILM COATED, EXTENDED RELEASE ORAL at 21:00

## 2021-10-14 RX ADMIN — CEFEPIME HYDROCHLORIDE 2000 MG: 2 INJECTION, POWDER, FOR SOLUTION INTRAVENOUS at 18:49

## 2021-10-14 ASSESSMENT — PAIN DESCRIPTION - ONSET
ONSET: AWAKENED FROM SLEEP
ONSET: AWAKENED FROM SLEEP
ONSET: ON-GOING
ONSET: AWAKENED FROM SLEEP

## 2021-10-14 ASSESSMENT — PAIN SCALES - GENERAL
PAINLEVEL_OUTOF10: 10
PAINLEVEL_OUTOF10: 8
PAINLEVEL_OUTOF10: 10
PAINLEVEL_OUTOF10: 7
PAINLEVEL_OUTOF10: 8
PAINLEVEL_OUTOF10: 10
PAINLEVEL_OUTOF10: 9
PAINLEVEL_OUTOF10: 10
PAINLEVEL_OUTOF10: 7
PAINLEVEL_OUTOF10: 10

## 2021-10-14 ASSESSMENT — PAIN DESCRIPTION - PAIN TYPE
TYPE: CHRONIC PAIN;VISCERAL PAIN
TYPE: ACUTE PAIN
TYPE: ACUTE PAIN;CHRONIC PAIN
TYPE: ACUTE PAIN
TYPE: ACUTE PAIN;CHRONIC PAIN
TYPE: OTHER (COMMENT)
TYPE: CHRONIC PAIN;VISCERAL PAIN

## 2021-10-14 ASSESSMENT — PAIN DESCRIPTION - DESCRIPTORS
DESCRIPTORS: CONSTANT;ACHING;NUMBNESS;TINGLING
DESCRIPTORS: ACHING;CONSTANT;SHARP
DESCRIPTORS: ACHING;CONSTANT;SHARP

## 2021-10-14 ASSESSMENT — PAIN DESCRIPTION - ORIENTATION
ORIENTATION: RIGHT;LEFT
ORIENTATION: RIGHT;LEFT

## 2021-10-14 ASSESSMENT — PAIN DESCRIPTION - LOCATION
LOCATION: LEG;ARM
LOCATION: ARM;LEG
LOCATION: ARM;LEG
LOCATION: LEG;ARM
LOCATION: ARM;LEG
LOCATION: LEG;ARM

## 2021-10-14 ASSESSMENT — PAIN DESCRIPTION - PROGRESSION
CLINICAL_PROGRESSION: GRADUALLY IMPROVING
CLINICAL_PROGRESSION: NOT CHANGED

## 2021-10-14 ASSESSMENT — PAIN DESCRIPTION - FREQUENCY
FREQUENCY: CONTINUOUS

## 2021-10-14 ASSESSMENT — PAIN - FUNCTIONAL ASSESSMENT
PAIN_FUNCTIONAL_ASSESSMENT: PREVENTS OR INTERFERES SOME ACTIVE ACTIVITIES AND ADLS

## 2021-10-14 NOTE — PROGRESS NOTES
Patient [facility] to [facility] in a lot of pain. Dr. Buffy Murphy notified of arrival. Consults all contacted. RN spoke to Dr. Mio Love who ordered a pain medication regiment and 1 UPRBC for hgb 6.6. I informed patient of said regiment and she agreed to it. She did, however, refuse the unit of blood at this time. She wants to wait for tomorrow to talk to her Dr. Obi Guerra she sees at [facility]. Patient educated on need for blood. Will monitor closely. -[id number]- Patient \"did some research\" and decided that she will take the unit of blood tonight. Consent signed. Awaiting cross match and available unit. Patient received the unit of blood but still complains of 10/10 pain between dilaudid doses. Dr. Nola Graham paged and awaiting response. He did say that he would speak to patient today with rounds today and if still she wanted, for him to speak to her doctor over at [facility] to discuss her usual [facility] treatment regiment.

## 2021-10-14 NOTE — PROGRESS NOTES
Transitions of Care Pharmacy Service   Medication Review    The patient's list of current home medications has been reviewed. Source(s) of information: patient, surescrimichael    Based on information provided by the above source(s), I have updated the patient's home med list as described below. I changed or updated the following medications on the patient's home medication list:  Discontinued · Morphine 1mg/ml Soln - list cleanup     Added · Ibuprofen 600mg - 1Q6H PRN pain  · Narcan Spray - PRN     Adjusted   · Clonazepam 1mg - added \"PRN anxiety\" to sig  · Benadryl 25mg changed to 50mg - changed from 1Q4H PRN itching to 1BID PRN itching/allergies  · Morphine 30mg changed to ER 30mg - 1BID  · Zofran 4mg changed to ODT - changed from 1Q4H PRN n/v to 1Q8H PRN n/v  · Oxycodone ER 20mg changed to IR 20mg - added sig 2-3Q4H PRN pain     Other Notes · Narcan Nasal Spray - prescribed 12/27/20, still has medication at home            Please feel free to call me with any questions about this encounter. Thank you. This note will be reviewed and co-signed by the [facility] Pharmacist. The pharmacist will review inpatient orders and contact the physician about any discrepancies. Rachel Bardales, pharmacy technician  [facility] Pharmacy Service  Phone:  [phone]  Fax: [phone]      Electronically signed by Rachel Bardales on 10/14/2021 at 4:44 PM     Prior to Admission medications    Medication Sig Start Date End Date Taking? Authorizing Provider   morphine (MS CONTIN) 30 MG extended release tablet Take 30 mg by mouth 2 times daily. ondansetron (ZOFRAN-ODT) 4 MG disintegrating tablet Take 4 mg by mouth every 8 hours as needed for Nausea or Vomiting       oxyCODONE (ROXICODONE) 20 MG immediate release tablet Take 40-60 mg by mouth every 4 hours as needed for Pain.  Take 2 to 3 tablets (=40mg-60mg) every 4 hours as needed for pain       ibuprofen (ADVIL;MOTRIN) 600 MG tablet Take 600 mg by mouth every 6 hours as needed for Pain       naloxone 4 MG/0.1ML LIQD nasal spray 1 spray by Nasal route as needed for Opioid Reversal       diphenhydrAMINE (BENADRYL) 50 MG capsule Take 50 mg by mouth 2 times daily as needed for Itching or Allergies        clonazePAM (KLONOPIN) 1 MG tablet Take 1 tablet by mouth 2 times daily as needed for Anxiety.

## 2021-10-14 NOTE — CONSULTS
Pulmonary Medicine and [address] Ryann Rollins MD      Patient - Monica Ca   MRN -  [id number]   [facility] # - [de-identified]   - 1975      Date of Admission -  10/13/2021 12:37 PM  Date of evaluation -  10/14/2021  Room - [facility]-   Suma Todd MD Primary Care Physician - Ev Montes MD     Reason for Consult    Pneumonia    Assessment   · Acute hypoxic respiratory insufficiency  · Extensive groundglass infiltrates right lung, present since 2020 CT chest from [facility],  ? Secondary to sickle cell disease versus atypical pneumonia  · Small to moderate right pleural effusion  · Active smoking  · Sickle cell crisis/anemia  · SVC and right heart dilation on CT chest compatible with moderate PAH  · Hyponatremia  · Elevated LFTs    Recommendations   · Continue oxygen via nasal cannula, keep SPO2 92% or greater  · Incentive spirometry every hour while awake  · Start Symbicort  · Start albuterol HFA every 4 hours as needed  · Start cefepime and Zithromax  · Covid testing by PCR  · Check echo  · Continue IV fluids  · Pain control with PCA per others  · Hematology oncology following  · GI following. Monitor LFTs. · X-ray chest in am  · Labs: CBC and BMP in am  · DVT prophylaxis, EPC's  · PUD prophylaxis, Protonix  · Discussed with RN  · Discussed with Dr. Jamarcus Santillan  · Will follow with you    Problem List      Patient Active Problem List   Diagnosis    Pneumonia       HPI     Monica Ca is 42 y.o.,  female who presented to the emergency room yesterday with complaints of generalized weakness and difficulty walking for the past 5 days. She has a history of sickle cell anemia. She denies any chest pain or shortness of breath. She did have some nausea without vomiting. No abdominal pain. Her primary complaints were of pain and weakness/numbness in her legs.   She is an active smoker, currently smoking less than a pack a week however did smoke more previously. She is not on any oxygen or inhalers at home. She currently denies any chest pain. She has occasional productive cough with white sputum. She has some mild shortness of breath, she feels however is secondary to anxiety. She denied any fever or chills. PMHx   Past Medical History      Diagnosis Date    Anxiety     Sickle cell anemia (HCC)       Past Surgical History        Procedure Laterality Date    CHOLECYSTECTOMY         Meds    Current Medications    sodium chloride flush  5-40 mL IntraVENous 2 times per day    pantoprazole  40 mg IntraVENous Daily    morphine  30 mg Oral BID     naloxone, sodium chloride flush, sodium chloride, polyethylene glycol, acetaminophen **OR** acetaminophen, ondansetron, diphenhydrAMINE, sodium chloride  IV Drips/Infusions   HYDROmorphone      sodium chloride      sodium chloride 150 mL/hr at 10/14/21 0016    sodium chloride       Home Medications  Medications Prior to Admission: oxyCODONE (OXYCONTIN) 20 MG extended release tablet, Take by mouth every 4 hours. morphine 1 MG/ML SOLN injection, Infuse intravenously continuous. morphine (MSIR) 30 MG tablet, Take 30 mg by mouth 2 times daily. ondansetron (ZOFRAN) 4 MG tablet, Take 4 mg by mouth every 4 hours as needed for Nausea or Vomiting  diphenhydrAMINE (BENADRYL) 25 MG capsule, Take 25 mg by mouth every 4 hours as needed for Itching  clonazePAM (KLONOPIN) 1 MG tablet, Take 1 tablet by mouth 2 times daily. .    Allergies    Lorazepam and Iodides  Social History     Social History     Tobacco Use    Smoking status: Current Every Day Smoker     Packs/day: 0.50     Types: Cigarettes    Smokeless tobacco: Never Used   Substance Use Topics    Alcohol use: Yes     Comment: occassionally     Family History    History reviewed. No pertinent family history.   ROS - 11 systems   General Denies any fever or chills  HEENT Denies any diplopia, tinnitus or vertigo  Resp positive for  cough with sputum and dyspnea  Cardiac Denies any chest pain, palpitations, claudication or edema  GI Denies any melena, hematochezia, hematemesis or pyrosis   Denies any frequency, urgency, hesitancy or incontinence  Heme Denies bruising or bleeding easily  Endocrine Denies any history of diabetes or thyroid disease  Neuro Denies any focal motor or sensory deficits  Psychiatric Denies anxiety, depression, suicidal ideation  Skin Denies rashes, itching, open sores    Vitals     height is 5' 7\" (1.702 m) and weight is 176 lb (79.8 kg). Her oral temperature is 98.1 °F (36.7 °C). Her blood pressure is 127/87 and her pulse is 92. Her respiration is 16 and oxygen saturation is 90%. Body mass index is 27.57 kg/m². I/O        Intake/Output Summary (Last 24 hours) at 10/14/2021 0913  Last data filed at 10/14/2021 0515  Gross per 24 hour   Intake 362.5 ml   Output --   Net 362.5 ml     I/O last 3 completed shifts: In: 362.5 [Blood:362.5]  Out: -    Patient Vitals for the past 96 hrs (Last 3 readings):   Weight   10/13/21 1239 176 lb (79.8 kg)     Exam   General Appearance  Awake, alert, oriented, restless  HEENT - Head is normocephalic, atraumatic. Pupil reactive to light  Neck - Supple,  trachea midline and straight  Lungs -moderate air exchange, occasional crackle  Cardiovascular - Heart sounds are normal.  Regular rhythm normal rate without murmur, gallop or rub. Abdomen - Soft, nontender, nondistended, no masses or organomegaly  Neurologic - CN II-XII are grossly intact.  There are no focal motor or sensory deficits  Skin - No bruising or bleeding  Extremities - No cyanosis, clubbing or edema    Labs  - Old records and notes have been reviewed in [facility]   CBC     Lab Results   Component Value Date    WBC 21.9 10/14/2021    RBC 2.65 10/14/2021    HGB 7.8 10/14/2021    HCT 22.0 10/14/2021     10/14/2021    MCV 83.0 10/14/2021    MCH 29.4 10/14/2021    MCHC 35.5 10/14/2021    RDW 19.3 10/14/2021    NRBC 69 10/14/2021    METASPCT 1 10/13/2021    LYMPHOPCT 9 10/14/2021    MONOPCT 10 10/14/2021    MYELOPCT 1 10/13/2021    BASOPCT 0 10/14/2021    MONOSABS 2.19 10/14/2021    LYMPHSABS 1.97 10/14/2021    EOSABS 0.00 10/14/2021    BASOSABS 0.00 10/14/2021    DIFFTYPE NOT REPORTED 10/14/2021     BMP   Lab Results   Component Value Date     10/14/2021    K 3.6 10/14/2021    CL 96 10/14/2021    CO2 19 10/14/2021    BUN 9 10/14/2021    CREATININE <0.40 10/14/2021    GLUCOSE 101 10/14/2021    CALCIUM 7.9 10/14/2021     LFTS  Lab Results   Component Value Date    ALKPHOS 139 10/14/2021    ALT 40 10/14/2021    AST 56 10/14/2021    PROT 6.8 10/14/2021    BILITOT 6.65 10/14/2021    BILIDIR 4.26 10/13/2021    IBILI 2.84 10/13/2021    LABALBU 3.1 10/14/2021       Radiology    CXR       CT Scans    (See actual reports for details)    \"Thank you for asking us to see this patient\"    Case discussed with nurse and patient. Questions and concerns addressed.     Electronically signed by     John Rivera MD on 10/14/2021 at 5:29 PM  Pulmonary Critical Care and Sleep Medicine,  [facility]  Cell: [phone]  Office: [phone]

## 2021-10-14 NOTE — PLAN OF CARE
PRE CONSULT ROUNDING NOTE  HPI  42year old female with pmh of sickle cell anemia who presented to the ED for a 5 day hx of weakness with difficulty walking , she is being treated for pneumonia. , our service is consulted for elevated lft's. She states she has had elevated lft's in the \"past \" but cannot give a reason why. Her LFT's were normal on 8/31/21. Has noticed darker urine for the last week but no abdomina pain or nausea. CT abd shows a small spleen and absent gallbladder. Na 128 wbc 21.9 hgb 7.8 alk phos 139 alt 40 ast 56 with bili 6.65. she denies starting any new medications or supplements, she has not had hypotension. She does take motrin 600mg tid a few times per week. She denies fevers chills dysphagia melena hematemesis hematochezia change in the bowel habits rash.    Endoscopy none  Family reports no hx of liver pancreatic stomach or colon cancer no uc/crohns  Social admits to  smoking states drinks a few glasses of wine three times per week no illicit drugs   /36   Pulse 92   Temp 98.1 °F (36.7 °C) (Oral)   Resp 16   Ht 5' 7\" (1.702 m)   Wt 176 lb (79.8 kg)   SpO2 90%   BMI 27.57 kg/m²     ROS as above meds labs imaging and past medical records were reviewed    Exam  General Appearance: alert and oriented to person, place and time, well-developed and well-nourished, in no acute distress  Skin: warm and dry, no rash or erythema  Head: normocephalic and atraumatic  Eyes: pupils equal, round, and reactive to light, extraocular eye movements intact, conjunctivae icteric  ENT: hearing grossly normal bilaterally  Neck: neck supple and non tender without mass, no thyromegaly or thyroid nodules, no cervical lymphadenopathy   Pulmonary/Chest: clear to auscultation bilaterally- no wheezes, rales or rhonchi, normal air movement, no respiratory distress  Cardiovascular: normal rate, regular rhythm, normal S1 and S2, no murmurs, rubs, clicks or gallops, distal pulses intact, no carotid bruits  Abdomen: soft, obese non tender, non-distended, normal bowel sounds, no masses or organomegaly no ascites  Extremities: no cyanosis, clubbing or edema  Musculoskeletal: normal range of motion, no joint swelling, deformity or tenderness  Neurologic: no cranial nerve deficit and muscle strength normal    Assessment  Elevated lft's  anemia  Pneumonia  Sickle cell anemia    Plan  D/w md  Liver w/u ordered  Trend hh and keep hgb >7  Pulmonary service following  Diet as tolerated from a gi standpoint  Formal gi consult to follow  . Dary Baker, APRN - CNP

## 2021-10-14 NOTE — H&P
History and Physical/ admit note      CHIEF COMPLAINT: Pain upper and lower limbs inability to ambulate    History of Present Illness: 82-year-old gentlewoman with known history of sickle cell comes in with pains in both arms and legs inability to ambulate denies any fever chills occasional cough nonproductive no shortness of breath no chest pain no palpitation no PND no orthopnea no dysuria hematuria or frequency        Past Medical History:   Diagnosis Date    Anxiety     Sickle cell anemia (HCC)          Past Surgical History:   Procedure Laterality Date    CHOLECYSTECTOMY         Medications Prior to Admission:    Medications Prior to Admission: morphine (MS CONTIN) 30 MG extended release tablet, Take 30 mg by mouth 2 times daily. ondansetron (ZOFRAN-ODT) 4 MG disintegrating tablet, Take 4 mg by mouth every 8 hours as needed for Nausea or Vomiting  oxyCODONE (ROXICODONE) 20 MG immediate release tablet, Take 40-60 mg by mouth every 4 hours as needed for Pain. Take 2 to 3 tablets (=40mg-60mg) every 4 hours as needed for pain  ibuprofen (ADVIL;MOTRIN) 600 MG tablet, Take 600 mg by mouth every 6 hours as needed for Pain  naloxone 4 MG/0.1ML LIQD nasal spray, 1 spray by Nasal route as needed for Opioid Reversal  diphenhydrAMINE (BENADRYL) 50 MG capsule, Take 50 mg by mouth 2 times daily as needed for Itching or Allergies   clonazePAM (KLONOPIN) 1 MG tablet, Take 1 tablet by mouth 2 times daily as needed for Anxiety. [DISCONTINUED] oxyCODONE (OXYCONTIN) 20 MG extended release tablet, Take by mouth every 4 hours. [DISCONTINUED] morphine 1 MG/ML SOLN injection, Infuse intravenously continuous. [DISCONTINUED] morphine (MSIR) 30 MG tablet, Take 30 mg by mouth 2 times daily. [DISCONTINUED] ondansetron (ZOFRAN) 4 MG tablet, Take 4 mg by mouth every 4 hours as needed for Nausea or Vomiting    Allergies:    Lorazepam and Iodides    Social History:    reports that she has been smoking cigarettes.  She has been smoking about 0.50 packs per day. She has never used smokeless tobacco. She reports current alcohol use. She reports current drug use. Drug: Opiates . Family History:   family history is not on file.     REVIEW OF SYSTEMS:  Constitutional: negative, No fever no chills  Eyes: negative  Ears, nose, mouth, throat, and face: negative  Respiratory: negative, Occasional cough no shortness of breath no wheezing no PND orthopnea  Cardiovascular: negative, Chest pain no palpitation no dizziness  Gastrointestinal: negative  Genitourinary:negative  Integument/breast: negative  Hematologic/lymphatic: negative  Musculoskeletal:negative  Neurological: negative, Headache no TIA no seizures  Behavioral/Psych: negative  Endocrine: negative, No polyuria no polydipsia no hypoglycemia  Allergic/Immunologic: negative  PHYSICAL EXAM:  General Appearance: alert and oriented to person, place and time and in no acute distress  Skin: warm and dry, no rash or erythema  Head: normocephalic and atraumatic  Eyes: sclera icteric and pallor  Neck: neck supple and non tender without mass   Pulmonary/Chest: clear to auscultation bilaterally- no wheezes, rales or rhonchi, normal air movement, no respiratory distress  Cardiovascular: normal rate, regular rhythm, normal S1 and S2, no gallops, intact distal pulses and no carotid bruits  Abdomen: soft, non-tender, non-distended, normal bowel sounds, no masses or organomegaly    Extremities: no edema and good pulses negative Homans  Neurologic: Alert oriented x3 no focal deficits mild distress    Vitals:  BP (!) 130/111   Pulse 97   Temp 98.1 °F (36.7 °C) (Oral)   Resp 22   Ht 5' 7\" (1.702 m)   Wt 176 lb (79.8 kg)   SpO2 94%   BMI 27.57 kg/m²         LABS:  CBC:   Lab Results   Component Value Date    WBC 21.9 10/14/2021    RBC 2.65 10/14/2021    HGB 7.8 10/14/2021    HCT 22.0 10/14/2021    MCV 83.0 10/14/2021    MCH 29.4 10/14/2021    MCHC 35.5 10/14/2021    RDW 19.3 10/14/2021     10/14/2021 MPV 10.3 10/14/2021     BMP:    Lab Results   Component Value Date     10/14/2021    K 3.6 10/14/2021    CL 96 10/14/2021    CO2 19 10/14/2021    BUN 9 10/14/2021    LABALBU 3.1 10/14/2021    CREATININE <0.40 10/14/2021    CALCIUM 7.9 10/14/2021    GFRAA CANNOT BE CALCULATED 10/14/2021    LABGLOM CANNOT BE CALCULATED 10/14/2021    GLUCOSE 101 10/14/2021         ASSESSMENT:    Sickle cell crisis  Anemia  Leukocytosis  Elevated LFTs  Hyponatremia  Chronic smoker  Pneumonia versus chronic lung disease  Patient Active Problem List   Diagnosis    Pneumonia    Elevated LFTs    Anemia    Sickle cell crisis ([facility] [address]       PLAN:    Meds labs reviewed continue with IV fluids packed RBCs to keep hemoglobin above 7 pain medication currently on PCA pump EPC cuffs check cortisol level serum and urine osmolalities thyroid recheck lites hematology pulmonary and GI consultation obtained Home meds reviewed restart except pain meds see orders, cultures obtained started on antibiotivs covid test neg, epc cuffs          Mary Bacon MD MD  6:11 PM  10/14/2021

## 2021-10-14 NOTE — CONSULTS
-- DO NOT REPLY / DO NOT REPLY ALL --  -- Message is from the [facility]--      Patient is requesting a medication refill - medication is on active list    Was Medication Pended? Yes.    Rx Name and Dose:  allopurinol (ZYLOPRIM) 100 MG tablet    Duration: 90 days    Pharmacy  [id number] - [place], [place] - [address]    Patient confirmed the above pharmacy as correct?  Yes    Caller Information       Type Contact Phone    03/10/2020 04:00 PM Phone (Incoming) Jarrod Triplett (Self) [phone] (M)          Alternative phone number: none    Turnaround time given to caller:   \"This message will be sent to [state Provider's name]. The clinical team will fulfill your request as soon as they review your message when the office opens tomorrow.\"   INITIAL CONSULTATION     HISTORY OF PRESENT ILLNESS: Agapito Hui is a 42 y.o. female admitted to the hospital on 10/13/2021 for shortness of breath. She was found to have pneumonia. She is on antibiotics for that. LFTs were noted to be elevated. She reports darker urine. She reports no prior GI problems. She takes Motrin. She was in the hospital around 4 to 6 weeks ago and was given antibiotics. She does not know the name of the antibiotics. She smokes. She reports a few drinks of alcohol 3 times a week. No drugs. No known family history of GI pathology. She has known sickle cell anemia. She reports no prior EGD or colonoscopy.      PAST MEDICAL HISTORY:     Past Medical History:   Diagnosis Date    Anxiety     Sickle cell anemia (HCC)         Past Surgical History:   Procedure Laterality Date    CHOLECYSTECTOMY            CURRENT MEDICATIONS:       Current Facility-Administered Medications:     naloxone (NARCAN) injection 0.4 mg, 0.4 mg, IntraVENous, PRN, Kristopher Strickland MD    HYDROmorphone (DILAUDID) 0.2 mg/mL PCA, , IntraVENous, Continuous, Cynthia Strickland MD, 12 mg at 10/14/21 0902    budesonide-formoterol (SYMBICORT) 160-4.5 MCG/ACT inhaler 2 puff, 2 puff, Inhalation, BID, JAMIE Zaragoza CNP    albuterol sulfate  (90 Base) MCG/ACT inhaler 2 puff, 2 puff, Inhalation, Q4H PRN, JAMIE Zaragoza CNP    azithromycin (ZITHROMAX) 500 mg in D5W 250ml addavial, 500 mg, IntraVENous, Q24H, JAMIE Austin CNP    cefTRIAXone (ROCEPHIN) 1000 mg IVPB in 50 mL D5W minibag, 1,000 mg, IntraVENous, Q24H, JAMIE Austin CNP    sodium chloride flush 0.9 % injection 5-40 mL, 5-40 mL, IntraVENous, 2 times per day, Sylvania Moritz, MD    sodium chloride flush 0.9 % injection 5-40 mL, 5-40 mL, IntraVENous, PRN, Sylvania Moritz, MD    0.9 % sodium chloride infusion, 25 mL, IntraVENous, PRN, Sylvania Moritz, MD    polyethylene glycol (GLYCOLAX) packet 17 g, 17 g, Oral, Daily PRN, Vera Mitchell MD    acetaminophen (TYLENOL) tablet 650 mg, 650 mg, Oral, Q6H PRN **OR** acetaminophen (TYLENOL) suppository 650 mg, 650 mg, Rectal, Q6H PRN, Vera Mitchell MD    0.9 % sodium chloride infusion, , IntraVENous, Continuous, Vera Mitchell MD, Last Rate: 150 mL/hr at 10/14/21 0016, New Bag at 10/14/21 0016    pantoprazole (PROTONIX) injection 40 mg, 40 mg, IntraVENous, Daily, Vera Mitchell MD, 40 mg at 10/14/21 0758    morphine (MS CONTIN) extended release tablet 30 mg, 30 mg, Oral, BID, Cynthia Strickland MD, 30 mg at 10/14/21 0756    ondansetron (ZOFRAN) injection 4 mg, 4 mg, IntraVENous, Q4H PRN, Isaura Sweet MD, 4 mg at 10/14/21 1424    diphenhydrAMINE (BENADRYL) tablet 25 mg, 25 mg, Oral, Q6H PRN, Isaura Sweet MD, 25 mg at 10/14/21 1424    0.9 % sodium chloride infusion, , IntraVENous, PRN, Cynthia Strickland MD       ALLERGIES:   Allergies   Allergen Reactions    Lorazepam Hives    Iodides Hives          FAMILY HISTORY: The patient's family history was reviewed.        SOCIAL HISTORY:   Social History     Socioeconomic History    Marital status: Single     Spouse name: Not on file    Number of children: Not on file    Years of education: Not on file    Highest education level: Not on file   Occupational History    Not on file   Tobacco Use    Smoking status: Current Every Day Smoker     Packs/day: 0.50     Types: Cigarettes    Smokeless tobacco: Never Used   Vaping Use    Vaping Use: Never used   Substance and Sexual Activity    Alcohol use: Yes     Comment: occassionally    Drug use: Yes     Types: Opiates      Comment: pt prescribed     Sexual activity: Yes     Partners: Male   Other Topics Concern    Not on file   Social History Narrative    Not on file     Social Determinants of Health     Financial Resource Strain:     Difficulty of Paying Living Expenses:    Food Insecurity:     Worried About Running Out of Food in fluid at the right base. There is airspace consolidation in the right lower lobe. There is mild atelectasis at the left base. Organs: Unenhanced liver is unremarkable. There is no evidence of intrahepatic or extrahepatic biliary dilatation. Gallbladder is surgically absent. The spleen is small and calcified. Limited unenhanced pancreas is unremarkable. Kidneys are symmetric in size and attenuation. No renal or ureteral calculus. No hydronephrosis or perinephric inflammation. GI/Bowel: There is no abnormal bowel distention or pericolonic inflammation. No free intraperitoneal air or fluid. Appendix is not seen. Pelvis: Urinary bladder is within normal limits. Uterus and adnexal structures are grossly unremarkable. There is no pelvic lymphadenopathy. Peritoneum/Retroperitoneum: The abdominal aorta is normal in caliber. There is no retroperitoneal or mesenteric lymphadenopathy. Bones/Soft Tissues: There are multilevel H-shaped vertebral bodies with patchy sclerosis, likely bone infarcts. No definite signs of acute osseous abnormality. 1.  No evidence of acute process in the abdomen or pelvis. 2.  Trace loculated right pleural fluid and right basilar airspace consolidation, suggesting pneumonia. 3.  Tiny pericardial effusion. 4.  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 CT CHEST WO CONTRAST    Result Date: 10/13/2021  EXAMINATION: CT OF THE CHEST WITHOUT CONTRAST 10/13/2021 2:27 pm TECHNIQUE: CT of the chest was performed without the administration of intravenous contrast. Multiplanar reformatted images are provided for review. Dose modulation, iterative reconstruction, and/or weight based adjustment of the mA/kV was utilized to reduce the radiation dose to as low as reasonably achievable. COMPARISON: None.  HISTORY: ORDERING SYSTEM PROVIDED HISTORY: infiltrates on abd TECHNOLOGIST PROVIDED HISTORY: infiltrates on abd Decision

## 2021-10-14 NOTE — CONSULTS
Today's Date: 10/14/2021  Patient Name: Divine Arellano  Date of admission: 10/13/2021 12:37 PM  Patient's age: 42 y.o., 1975  Admission Dx: Sickle cell crisis ([facility] [D57.00]  Pneumonia [J18.9]  Pneumonia of both lungs due to infectious organism, unspecified part of lung [J18.9]  Anemia, unspecified type [D64.9]    Reason for Consult: management recommendations  Requesting Physician: Melanie Wright MD    CHIEF COMPLAINT:  SICKLE CELL CRISIS, PNEUMONIA     History Obtained From:  patient    HISTORY OF PRESENT ILLNESS:      The patient is a 42 y.o.  female who is admitted to the hospital for leg pain and arm pain. 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 The patient  follows with a hematologist at the [address] [place] [address] system at this time could not be admitted to the hospital that he follows. I discussed the case with Dr. Jason Castillo. Who is familiar with her case. She has frequent admissions to the hospital and mostly because of sickle cell crisis. Upon admission, the patient underwent CT scan that showed groundglass appearance and pleural effusion. She was technically diagnosed with pneumonia however, she had a CT scan done at [address] [place] [address] in August that showed similar findings so I think these are chronic lung findings. Patient is in extreme pain. She is quite upset and demanding specific dosing her medications. She is demanding PCA. Her hemoglobin was under 7 so she received 1 unit of blood. She has a previous history of iron overload and required phlebotomy. 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 She is not taking hydroxyurea or iron chelation    Past Medical History:   has a past medical history of Anxiety and Sickle cell anemia ([facility] Past Surgical History:   has a past surgical history that includes Cholecystectomy.      Medications:    Reviewed in Epic     Allergies:  Lorazepam and Iodides    Social History:   reports that she has been smoking cigarettes. She has been smoking about 0.50 packs per day. She has never used smokeless tobacco. She reports current alcohol use. She reports current drug use. Drug: Opiates . Family History: family history is not on file. REVIEW OF SYSTEMS:    Constitutional: No fever or chills. Severe pain in her right arm and right leg  Eyes: No eye discharge, double vision, or eye pain   HEENT: negative for sore mouth, sore throat, hoarseness and voice change   Respiratory: She has chest discomfort, cough but no hemoptysis, no significant dyspnea at rest   Cardiovascular: negative for chest pain, dyspnea, palpitations, orthopnea, PND   Gastrointestinal: negative for nausea, vomiting, diarrhea, constipation, abdominal pain, Dysphagia, hematemesis and hematochezia   Genitourinary: negative for frequency, dysuria, nocturia, urinary incontinence, and hematuria   Integument: negative for rash, skin lesions, bruises.    Hematologic/Lymphatic: negative for easy bruising, bleeding, lymphadenopathy, or petechiae   Endocrine: negative for heat or cold intolerance,weight changes, change in bowel habits and hair loss   Musculoskeletal: negative for myalgias, arthralgias, pain, joint swelling,and bone pain   Neurological: negative for headaches, dizziness, seizures, weakness, numbness    PHYSICAL EXAM:      /88   Pulse 100   Temp 98.1 °F (36.7 °C) (Oral)   Resp 22   Ht 5' 7\" (1.702 m)   Wt 176 lb (79.8 kg)   SpO2 94%   BMI 27.57 kg/m²    Temp (24hrs), Av.3 °F (36.8 °C), Min:97.2 °F (36.2 °C), Max:99.1 °F (37.3 °C)    General appearance - well appearing, in significant pain as discussed  Mental status - alert and cooperative   Eyes - pupils equal and reactive, extraocular eye movements intact   Ears - bilateral TM's and external ear canals normal   Mouth - mucous membranes moist, pharynx normal without lesions   Neck - supple, no significant adenopathy   Lymphatics - no palpable lymphadenopathy, no hepatosplenomegaly Chest - clear to auscultation, no wheezes, rales or rhonchi, symmetric air entry   Heart - normal rate, regular rhythm, normal S1, S2, no murmurs  Abdomen - soft, nontender, nondistended, no masses or organomegaly   Neurological - alert, oriented, normal speech, no focal findings or movement disorder noted   Musculoskeletal - no joint tenderness, deformity or swelling   Extremities -exam limited by her severe pain. But I do not see any visible abnormality in her arms or legs  Skin - normal coloration and turgor, no rashes, no suspicious skin lesions noted ,    DATA:    Labs:   CBC: Recent Labs     10/13/21  2048 10/14/21  0601   WBC 10.4 21.9*   HGB 6.6* 7.8*   HCT 18.7* 22.0*    283     BMP:   Recent Labs     10/13/21  1310 10/13/21  1310 10/13/21  2048 10/14/21  0601   *   < > 125* 128*   K 4.2   < > 3.9 3.6*   CO2 23   < > 22 19*   BUN 20  --   --  9   CREATININE 0.50  --   --  <0.40*   LABGLOM >60  --   --  CANNOT BE CALCULATED   GLUCOSE 108*  --   --  101*    < > = values in this interval not displayed. PT/INR: No results for input(s): PROTIME, INR in the last 72 hours. IMAGING DATA:      Primary Problem  <principal problem not specified>    Active Hospital Problems    Diagnosis Date Noted    Elevated LFTs [R79.89]     Anemia [D64.9]     Sickle cell crisis ([facility] [D57.00]     Pneumonia [J18.9] 10/13/2021         IMPRESSION:   1. Hemoglobin SC disease  2. Transfusion related iron overload  3. Sickle cell crisis  4. Chronic lung findings leading to chronic respiratory failure    RECOMMENDATIONS:  1. Appreciate current therapy by primary team  2. For pain control, she is on MS Contin and we will write for PCA with Dilaudid  3. Agree with transfusion to try to keep hemoglobin around 7  4. Difficult situation, the patient symptoms to be acute on top of chronic but she is quite opioid tolerant. 5. Watch hemoglobin and transfuse as needed  6.  Doubt clinical pneumonia but I would defer final

## 2021-10-14 NOTE — CARE COORDINATION
CASE MANAGEMENT NOTE:    Admission Date:  10/13/2021 Divine Arellano is a 42 y.o.  female    Admitted for : Sickle cell crisis ([facility] [D57.00]  Pneumonia [J18.9]  Pneumonia of both lungs due to infectious organism, unspecified part of lung [J18.9]  Anemia, unspecified type [D64.9]    Met with:  Patient    PCP:  Stas Chin MD                                Insurance:  [employer]      Is patient alert and oriented at time of discussion:  Yes    Current Residence/ Living Arrangements:  independently at home             Current Services PTA:  No    Does patient go to outpatient dialysis: No  If yes, location and chair time: n/a    Is patient agreeable to VNS: No    Freedom of choice provided:  Yes    List of [address] provided: No    VNS chosen:  No    DME:  none    Home Oxygen: No    Nebulizer: Yes    CPAP/BIPAP: No    Supplier: [facility] [address] at [place] Needed: No    SNF needed: No    Freedom of choice and list provided: Yes    Pharmacy:  [facility] at [place] [facility]       Does Patient want to use MEDS to BEDS? No    Is patient currently receiving oral anticoagulation therapy? NA    Is the Patient an [facility] with Readmission Risk Score greater than 14%? No  If yes, pt needs a follow up appointment made within 7 days. Family Members/Caregivers that pt would like involved in their care:    Yes    If yes, list name here:  Son  And parent    Transportation Provider:  Family             Discharge Plan:  The Plan for Transition of Care is related to the following treatment goals: return home where she lives with son. Mom supportive also. Pt states no needs one story home. No dme. The Patient and/or patient representative patient was provided with a choice of provider and agrees   with the discharge plan.  [x] Yes [] No    Freedom of choice list was provided with basic dialogue that supports the patient's individualized plan of care/goals, treatment preferences and shares the quality data associated with the providers.  [x] Yes [] No                 Electronically signed by: AUSTIN Umaña LSW on 10/14/2021 at 12:09 PM

## 2021-10-14 NOTE — FLOWSHEET NOTE
10/14/21 1447   Provider Notification   Reason for Communication Evaluate   Provider Name Dr. Nain Morse   Provider Notification Physician   Method of Communication Call   Notification Time 21    Message left of cell phone VM. Waiting on response.

## 2021-10-15 ENCOUNTER — APPOINTMENT (OUTPATIENT)
Dept: GENERAL RADIOLOGY | Age: 46
DRG: 662 | End: 2021-10-15
Payer: MEDICARE

## 2021-10-15 LAB
-: ABNORMAL
ABO/RH: NORMAL
ABSOLUTE EOS #: 0 K/UL (ref 0–0.44)
ABSOLUTE IMMATURE GRANULOCYTE: 0.84 K/UL (ref 0–0.3)
ABSOLUTE LYMPH #: 1.51 K/UL (ref 1.1–3.7)
ABSOLUTE MONO #: 1.85 K/UL (ref 0.1–1.2)
ADENOVIRUS PCR: NOT DETECTED
ALBUMIN SERPL-MCNC: 3.2 G/DL (ref 3.5–5.2)
ALBUMIN/GLOBULIN RATIO: ABNORMAL (ref 1–2.5)
ALP BLD-CCNC: 133 U/L (ref 35–104)
ALT SERPL-CCNC: 38 U/L (ref 5–33)
AMORPHOUS: ABNORMAL
ANION GAP SERPL CALCULATED.3IONS-SCNC: 12 MMOL/L (ref 9–17)
ANTI DNA DOUBLE STRANDED: 0.7 IU/ML
ANTI-NUCLEAR ANTIBODY (ANA): NEGATIVE
ANTIBODY SCREEN: NEGATIVE
ARM BAND NUMBER: NORMAL
AST SERPL-CCNC: 46 U/L
BACTERIA: ABNORMAL
BASOPHILS # BLD: 0 % (ref 0–2)
BASOPHILS ABSOLUTE: 0 K/UL (ref 0–0.2)
BILIRUB SERPL-MCNC: 4.15 MG/DL (ref 0.3–1.2)
BILIRUBIN DIRECT: 2.68 MG/DL
BILIRUBIN URINE: ABNORMAL
BILIRUBIN, INDIRECT: 1.47 MG/DL (ref 0–1)
BLD PROD TYP BPU: NORMAL
BORDETELLA PARAPERTUSSIS: NOT DETECTED
BORDETELLA PERTUSSIS PCR: NOT DETECTED
BUN BLDV-MCNC: 4 MG/DL (ref 6–20)
BUN/CREAT BLD: ABNORMAL (ref 9–20)
CALCIUM SERPL-MCNC: 7.8 MG/DL (ref 8.6–10.4)
CASTS UA: ABNORMAL /LPF
CHLAMYDIA PNEUMONIAE BY PCR: NOT DETECTED
CHLORIDE BLD-SCNC: 101 MMOL/L (ref 98–107)
CMV IGM: 0.1
CO2: 21 MMOL/L (ref 20–31)
COLOR: ABNORMAL
COMMENT UA: ABNORMAL
CORONAVIRUS 229E PCR: NOT DETECTED
CORONAVIRUS HKU1 PCR: NOT DETECTED
CORONAVIRUS NL63 PCR: NOT DETECTED
CORONAVIRUS OC43 PCR: NOT DETECTED
CORTISOL COLLECTION INFO: ABNORMAL
CORTISOL: 27.6 UG/DL (ref 2.7–18.4)
CREAT SERPL-MCNC: <0.4 MG/DL (ref 0.5–0.9)
CROSSMATCH RESULT: NORMAL
CRYSTALS, UA: ABNORMAL /HPF
CYTOMEGALOVIRUS IGG ANTIBODY: 0.5
DIFFERENTIAL TYPE: ABNORMAL
DISPENSE STATUS BLOOD BANK: NORMAL
ENA ANTIBODIES SCREEN: 0.1 U/ML
EOSINOPHILS RELATIVE PERCENT: 0 % (ref 1–4)
EPITHELIAL CELLS UA: ABNORMAL /HPF (ref 0–5)
EXPIRATION DATE: NORMAL
GFR AFRICAN AMERICAN: ABNORMAL ML/MIN
GFR NON-AFRICAN AMERICAN: ABNORMAL ML/MIN
GFR SERPL CREATININE-BSD FRML MDRD: ABNORMAL ML/MIN/{1.73_M2}
GFR SERPL CREATININE-BSD FRML MDRD: ABNORMAL ML/MIN/{1.73_M2}
GLOBULIN: ABNORMAL G/DL (ref 1.5–3.8)
GLUCOSE BLD-MCNC: 111 MG/DL (ref 70–99)
GLUCOSE URINE: NEGATIVE
HCT VFR BLD CALC: 21.6 % (ref 36.3–47.1)
HCT VFR BLD CALC: 22.8 % (ref 36.3–47.1)
HEMOGLOBIN: 7.6 G/DL (ref 11.9–15.1)
HEMOGLOBIN: 8 G/DL (ref 11.9–15.1)
HUMAN METAPNEUMOVIRUS PCR: NOT DETECTED
IMMATURE GRANULOCYTES: 5 %
INFLUENZA A BY PCR: NOT DETECTED
INFLUENZA A H1 (2009) PCR: NORMAL
INFLUENZA A H1 PCR: NORMAL
INFLUENZA A H3 PCR: NORMAL
INFLUENZA B BY PCR: NOT DETECTED
KETONES, URINE: NEGATIVE
LACTIC ACID, SEPSIS WHOLE BLOOD: NORMAL MMOL/L (ref 0.5–1.9)
LACTIC ACID, SEPSIS: 1.7 MMOL/L (ref 0.5–1.9)
LEUKOCYTE ESTERASE, URINE: ABNORMAL
LYMPHOCYTES # BLD: 9 % (ref 24–43)
MCH RBC QN AUTO: 29.3 PG (ref 25.2–33.5)
MCH RBC QN AUTO: 29.3 PG (ref 25.2–33.5)
MCHC RBC AUTO-ENTMCNC: 35.1 G/DL (ref 28.4–34.8)
MCHC RBC AUTO-ENTMCNC: 35.2 G/DL (ref 28.4–34.8)
MCV RBC AUTO: 83.4 FL (ref 82.6–102.9)
MCV RBC AUTO: 83.5 FL (ref 82.6–102.9)
MITOCHONDRIAL ANTIBODY: 1.1 U/ML (ref 0–4)
MONOCYTES # BLD: 11 % (ref 3–12)
MORPHOLOGY: ABNORMAL
MUCUS: ABNORMAL
MYCOPLASMA PNEUMONIAE PCR: NOT DETECTED
NITRITE, URINE: NEGATIVE
NRBC AUTOMATED: 67.8 PER 100 WBC
NRBC AUTOMATED: 68.1 PER 100 WBC
OSMOLALITY URINE: 199 MOSM/KG (ref 300–1170)
OTHER OBSERVATIONS UA: ABNORMAL
PARAINFLUENZA 1 PCR: NOT DETECTED
PARAINFLUENZA 2 PCR: NOT DETECTED
PARAINFLUENZA 3 PCR: NOT DETECTED
PARAINFLUENZA 4 PCR: NOT DETECTED
PDW BLD-RTO: 19.7 % (ref 11.8–14.4)
PDW BLD-RTO: 20.3 % (ref 11.8–14.4)
PH UA: 6 (ref 5–8)
PLATELET # BLD: 305 K/UL (ref 138–453)
PLATELET # BLD: 306 K/UL (ref 138–453)
PLATELET ESTIMATE: ABNORMAL
PMV BLD AUTO: 10.5 FL (ref 8.1–13.5)
PMV BLD AUTO: 10.5 FL (ref 8.1–13.5)
POTASSIUM SERPL-SCNC: 3.7 MMOL/L (ref 3.7–5.3)
PROTEIN UA: NEGATIVE
RBC # BLD: 2.59 M/UL (ref 3.95–5.11)
RBC # BLD: 2.73 M/UL (ref 3.95–5.11)
RBC # BLD: ABNORMAL 10*6/UL
RBC UA: ABNORMAL /HPF (ref 0–2)
RENAL EPITHELIAL, UA: ABNORMAL /HPF
RESP SYNCYTIAL VIRUS PCR: NOT DETECTED
RHINO/ENTEROVIRUS PCR: NOT DETECTED
SARS-COV-2, PCR: NOT DETECTED
SEG NEUTROPHILS: 75 % (ref 36–65)
SEGMENTED NEUTROPHILS ABSOLUTE COUNT: 12.6 K/UL (ref 1.5–8.1)
SODIUM BLD-SCNC: 134 MMOL/L (ref 135–144)
SODIUM,UR: 45 MMOL/L
SPECIFIC GRAVITY UA: 1.01 (ref 1–1.03)
SPECIMEN DESCRIPTION: NORMAL
TOTAL PROTEIN: 5.8 G/DL (ref 6.4–8.3)
TRANSFUSION STATUS: NORMAL
TRICHOMONAS: ABNORMAL
TSH SERPL DL<=0.05 MIU/L-ACNC: 2.26 MIU/L (ref 0.3–5)
TURBIDITY: CLEAR
UNIT DIVISION: 0
UNIT NUMBER: NORMAL
URINE HGB: NEGATIVE
UROBILINOGEN, URINE: ABNORMAL
WBC # BLD: 15.9 K/UL (ref 3.5–11.3)
WBC # BLD: 16.8 K/UL (ref 3.5–11.3)
WBC # BLD: ABNORMAL 10*3/UL
WBC UA: ABNORMAL /HPF (ref 0–5)
YEAST: ABNORMAL

## 2021-10-15 PROCEDURE — 6370000000 HC RX 637 (ALT 250 FOR IP): Performed by: INTERNAL MEDICINE

## 2021-10-15 PROCEDURE — 84165 PROTEIN E-PHORESIS SERUM: CPT

## 2021-10-15 PROCEDURE — 84443 ASSAY THYROID STIM HORMONE: CPT

## 2021-10-15 PROCEDURE — 83605 ASSAY OF LACTIC ACID: CPT

## 2021-10-15 PROCEDURE — C9113 INJ PANTOPRAZOLE SODIUM, VIA: HCPCS | Performed by: INTERNAL MEDICINE

## 2021-10-15 PROCEDURE — 81001 URINALYSIS AUTO W/SCOPE: CPT

## 2021-10-15 PROCEDURE — 80076 HEPATIC FUNCTION PANEL: CPT

## 2021-10-15 PROCEDURE — 71045 X-RAY EXAM CHEST 1 VIEW: CPT

## 2021-10-15 PROCEDURE — 94760 N-INVAS EAR/PLS OXIMETRY 1: CPT

## 2021-10-15 PROCEDURE — 2700000000 HC OXYGEN THERAPY PER DAY

## 2021-10-15 PROCEDURE — 94640 AIRWAY INHALATION TREATMENT: CPT

## 2021-10-15 PROCEDURE — 6360000002 HC RX W HCPCS: Performed by: INTERNAL MEDICINE

## 2021-10-15 PROCEDURE — APPSS30 APP SPLIT SHARED TIME 16-30 MINUTES: Performed by: NURSE PRACTITIONER

## 2021-10-15 PROCEDURE — 82533 TOTAL CORTISOL: CPT

## 2021-10-15 PROCEDURE — 84300 ASSAY OF URINE SODIUM: CPT

## 2021-10-15 PROCEDURE — 73521 X-RAY EXAM HIPS BI 2 VIEWS: CPT

## 2021-10-15 PROCEDURE — 6360000002 HC RX W HCPCS: Performed by: NURSE PRACTITIONER

## 2021-10-15 PROCEDURE — 80048 BASIC METABOLIC PNL TOTAL CA: CPT

## 2021-10-15 PROCEDURE — 84155 ASSAY OF PROTEIN SERUM: CPT

## 2021-10-15 PROCEDURE — 36415 COLL VENOUS BLD VENIPUNCTURE: CPT

## 2021-10-15 PROCEDURE — 85027 COMPLETE CBC AUTOMATED: CPT

## 2021-10-15 PROCEDURE — 2060000000 HC ICU INTERMEDIATE R&B

## 2021-10-15 PROCEDURE — 83935 ASSAY OF URINE OSMOLALITY: CPT

## 2021-10-15 PROCEDURE — 73560 X-RAY EXAM OF KNEE 1 OR 2: CPT

## 2021-10-15 PROCEDURE — 2580000003 HC RX 258: Performed by: INTERNAL MEDICINE

## 2021-10-15 PROCEDURE — 99232 SBSQ HOSP IP/OBS MODERATE 35: CPT | Performed by: INTERNAL MEDICINE

## 2021-10-15 PROCEDURE — 2580000003 HC RX 258: Performed by: NURSE PRACTITIONER

## 2021-10-15 PROCEDURE — 85025 COMPLETE CBC W/AUTO DIFF WBC: CPT

## 2021-10-15 RX ORDER — CLONAZEPAM 0.5 MG/1
1 TABLET ORAL 2 TIMES DAILY PRN
Status: DISCONTINUED | OUTPATIENT
Start: 2021-10-15 | End: 2021-10-17 | Stop reason: HOSPADM

## 2021-10-15 RX ORDER — CLONAZEPAM 0.5 MG/1
1 TABLET ORAL 2 TIMES DAILY
Status: DISCONTINUED | OUTPATIENT
Start: 2021-10-15 | End: 2021-10-15

## 2021-10-15 RX ADMIN — BUDESONIDE AND FORMOTEROL FUMARATE DIHYDRATE 2 PUFF: 160; 4.5 AEROSOL RESPIRATORY (INHALATION) at 10:31

## 2021-10-15 RX ADMIN — DIPHENHYDRAMINE HYDROCHLORIDE 25 MG: 25 TABLET ORAL at 08:50

## 2021-10-15 RX ADMIN — SODIUM CHLORIDE: 9 INJECTION, SOLUTION INTRAVENOUS at 02:03

## 2021-10-15 RX ADMIN — BUDESONIDE AND FORMOTEROL FUMARATE DIHYDRATE 2 PUFF: 160; 4.5 AEROSOL RESPIRATORY (INHALATION) at 19:40

## 2021-10-15 RX ADMIN — MORPHINE SULFATE 30 MG: 15 TABLET, FILM COATED, EXTENDED RELEASE ORAL at 08:49

## 2021-10-15 RX ADMIN — Medication 12 MG: at 00:39

## 2021-10-15 RX ADMIN — PANTOPRAZOLE SODIUM 40 MG: 40 INJECTION, POWDER, FOR SOLUTION INTRAVENOUS at 08:49

## 2021-10-15 RX ADMIN — CLONAZEPAM 1 MG: 0.5 TABLET ORAL at 17:45

## 2021-10-15 RX ADMIN — CEFEPIME HYDROCHLORIDE 2000 MG: 2 INJECTION, POWDER, FOR SOLUTION INTRAVENOUS at 18:45

## 2021-10-15 RX ADMIN — SODIUM CHLORIDE: 9 INJECTION, SOLUTION INTRAVENOUS at 16:51

## 2021-10-15 RX ADMIN — Medication 12 MG: at 07:51

## 2021-10-15 RX ADMIN — Medication 12 MG: at 19:25

## 2021-10-15 RX ADMIN — DIPHENHYDRAMINE HYDROCHLORIDE 25 MG: 25 TABLET ORAL at 21:40

## 2021-10-15 RX ADMIN — SODIUM CHLORIDE: 9 INJECTION, SOLUTION INTRAVENOUS at 10:02

## 2021-10-15 RX ADMIN — MORPHINE SULFATE 30 MG: 15 TABLET, FILM COATED, EXTENDED RELEASE ORAL at 21:03

## 2021-10-15 RX ADMIN — AZITHROMYCIN MONOHYDRATE 500 MG: 500 INJECTION, POWDER, LYOPHILIZED, FOR SOLUTION INTRAVENOUS at 16:50

## 2021-10-15 RX ADMIN — SODIUM CHLORIDE, PRESERVATIVE FREE 10 ML: 5 INJECTION INTRAVENOUS at 08:49

## 2021-10-15 RX ADMIN — CEFEPIME HYDROCHLORIDE 2000 MG: 2 INJECTION, POWDER, FOR SOLUTION INTRAVENOUS at 05:26

## 2021-10-15 RX ADMIN — Medication 12 MG: at 14:13

## 2021-10-15 ASSESSMENT — PAIN DESCRIPTION - ORIENTATION
ORIENTATION: RIGHT;LEFT
ORIENTATION: RIGHT;LEFT

## 2021-10-15 ASSESSMENT — PAIN DESCRIPTION - PROGRESSION
CLINICAL_PROGRESSION: NOT CHANGED
CLINICAL_PROGRESSION: NOT CHANGED

## 2021-10-15 ASSESSMENT — PAIN SCALES - GENERAL
PAINLEVEL_OUTOF10: 9
PAINLEVEL_OUTOF10: 9
PAINLEVEL_OUTOF10: 10

## 2021-10-15 ASSESSMENT — PAIN DESCRIPTION - DESCRIPTORS
DESCRIPTORS: ACHING;DISCOMFORT
DESCRIPTORS: ACHING;DISCOMFORT

## 2021-10-15 ASSESSMENT — PAIN - FUNCTIONAL ASSESSMENT: PAIN_FUNCTIONAL_ASSESSMENT: PREVENTS OR INTERFERES SOME ACTIVE ACTIVITIES AND ADLS

## 2021-10-15 ASSESSMENT — PAIN DESCRIPTION - FREQUENCY
FREQUENCY: CONTINUOUS
FREQUENCY: CONTINUOUS

## 2021-10-15 ASSESSMENT — PAIN DESCRIPTION - ONSET
ONSET: ON-GOING
ONSET: GRADUAL

## 2021-10-15 ASSESSMENT — PAIN DESCRIPTION - LOCATION
LOCATION: GENERALIZED;LEG
LOCATION: GENERALIZED;LEG

## 2021-10-15 ASSESSMENT — PAIN DESCRIPTION - PAIN TYPE
TYPE: CHRONIC PAIN
TYPE: CHRONIC PAIN

## 2021-10-15 NOTE — PROGRESS NOTES
[facility]    Gastroenterology Daily Progress Note      Patient:   Monico Reynoso   :    1975   Facility:   [facility]  Date:     10/15/2021  Consultant:   JAMIE Kemp CNP, CNP      SUBJECTIVE  42 y.o. female admitted 10/13/2021 with Sickle cell crisis ([facility] [D57.00]  Pneumonia [J18.9]  Pneumonia of both lungs due to infectious organism, unspecified part of lung [J18.9]  Anemia, unspecified type [D64.9] and seen for elevated lft's. The pt was seen and examined. lft's are trending down, liver eval ongoing. Reports body pain \"all over\". Reports nausea but no emesis. Donna Beal requesting anxiety medications.          OBJECTIVE  Scheduled Meds:   budesonide-formoterol  2 puff Inhalation BID    azithromycin  500 mg IntraVENous Q24H    cefepime  2,000 mg IntraVENous Q12H    sodium chloride flush  5-40 mL IntraVENous 2 times per day    pantoprazole  40 mg IntraVENous Daily    morphine  30 mg Oral BID       Vital Signs:  /89   Pulse 93   Temp 98.6 °F (37 °C)   Resp 18   Ht 5' 7\" (1.702 m)   Wt 176 lb 1.6 oz (79.9 kg)   SpO2 100%   BMI 27.58 kg/m²      Physical Exam:   General Appearance: alert and oriented to person, place and time, well-developed and well-nourished, in no acute distress  Skin: warm and dry, no rash or erythema  Head: normocephalic and atraumatic  Eyes: pupils equal, round, and reactive to light, extraocular eye movements intact, conjunctivae normal  ENT: hearing grossly normal bilaterally  Neck: neck supple and non tender without mass, no thyromegaly or thyroid nodules, no cervical lymphadenopathy   Pulmonary/Chest: clear to auscultation bilaterally- no wheezes, rales or rhonchi, normal air movement, no respiratory distress  Cardiovascular: normal rate, regular rhythm, normal S1 and S2, no murmurs, rubs, clicks or gallops, distal pulses intact, no carotid bruits  Abdomen: soft, non-tender, non-distended, normal bowel sounds, no masses or organomegaly  Extremities: no cyanosis, clubbing or edema  Musculoskeletal: normal range of motion, no joint swelling, deformity or tenderness  Neurologic: no cranial nerve deficit and muscle strength normal    Lab and Imaging Review     CBC  Recent Labs     10/13/21  2048 10/14/21  0601 10/15/21  0530   WBC 10.4 21.9* 16.8*   HGB 6.6* 7.8* 7.6*   HCT 18.7* 22.0* 21.6*   MCV 83.1 83.0 83.4    283 305       BMP  Recent Labs     10/13/21  1310 10/13/21  1310 10/13/21  2048 10/14/21  0601 10/15/21  0530   *   < > 125* 128* 134*   K 4.2   < > 3.9 3.6* 3.7   CL 90*   < > 91* 96* 101   CO2 23   < > 22 19* 21   BUN 20  --   --  9 4*   CREATININE 0.50  --   --  <0.40* <0.40*   GLUCOSE 108*  --   --  101* 111*   CALCIUM 8.6  --   --  7.9* 7.8*    < > = values in this interval not displayed. LFTS  Recent Labs     10/13/21  1310 10/14/21  0601 10/15/21  0530   ALKPHOS 134* 139* 133*   ALT 43* 40* 38*   AST 64* 56* 46*   PROT 7.4 6.8 5.8*  6.0*   BILITOT 7.10* 6.65* 4.15*   BILIDIR 4.26*  --  2.68*   LABALBU 3.6 3.1* 3.2*       AMYLASE/LIPASE/AMMONIA  Recent Labs     10/13/21  1310   AMYLASE 136*   LIPASE 52         ANEMIA STUDIES  Recent Labs     10/14/21  0601 10/14/21  1246   LABIRON 20  --    TIBC 226*  --    FERRITIN 2,830*  --    [id number]  --  392   FOLATE  --  13.6     CT ABDOMEN PELVIS WO CONTRAST Additional Contrast? None     Result Date: 10/13/2021  EXAMINATION: CT OF THE ABDOMEN AND PELVIS WITHOUT CONTRAST 10/13/2021 2:06 pm TECHNIQUE: CT of the abdomen and pelvis was performed without the administration of intravenous contrast. Multiplanar reformatted images are provided for review. Dose modulation, iterative reconstruction, and/or weight based adjustment of the mA/kV was utilized to reduce the radiation dose to as low as reasonably achievable.  COMPARISON: None HISTORY: ORDERING SYSTEM PROVIDED HISTORY: Elevated bilirubin and elevated white blood cell count TECHNOLOGIST PROVIDED HISTORY: sickle cell anemia.        ASSESSMENT/plan  1.  Elevated lft's ; trending down, elevation possibly due to medication side effects  -liver eval ongoing  -avoid hepatotoxic meds and alcohol       2.acute hypoxic respiratory insufficiency ?pneumonia mgt per pulmonary service    3.sickle cell anemia; hematology following  -pain mgt     Case d/w md gi signing off to follow up as outpt    This plan was formulated in collaboration with Dr. Dave To    Electronically signed by: JAMIE Cox - CNP on 10/15/2021 at 12:58 PM

## 2021-10-15 NOTE — PROGRESS NOTES
Pt states she want to transfer to [facility] so that she can be seen by Dr. Lina Kemp, who manages her sickle cell. Dr. Rambo Patel notified. Discharge planner to f/u . X rays completed.

## 2021-10-15 NOTE — CARE COORDINATION
Discharge planning    Patient expressing interest to transfer to [facility]. RN spoke with Dr Sujatha Bunch and ok to initiate transfer .  Requesting under Dr Dimitry Ovalle ( hem/onc) or Dr Felix Mukherjee to be provider    Call to net access at [phone] and transfer initiated

## 2021-10-15 NOTE — PLAN OF CARE
Problem: Pain:  Description: Pain management should include both nonpharmacologic and pharmacologic interventions. Goal: Pain level will decrease  Description: Pain level will decrease  Outcome: Ongoing  Pt states pain continues at 10, but does have periods of sleep and rest. Cont on Dilauded PCA, with Morphine po scheduled twice a day. Klonopin ordered prn for anxiety.

## 2021-10-15 NOTE — PLAN OF CARE
Problem: Falls - Risk of:  Goal: Will remain free from falls  Outcome: Ongoing     Problem: Pain:  Goal: Pain level will decrease  Outcome: Ongoing     Problem: Pain:  Goal: Control of acute pain  Outcome: Ongoing

## 2021-10-15 NOTE — PROGRESS NOTES
Pulmonary Critical Care Progress Note  Arnie Lowery MD     Patient seen for the follow up of acute hypoxic respiratory insufficiency, extensive groundglass infiltrates right lung, right pleural effusion, active smoking, sickle cell crisis    Subjective:  No significant overnight events noted. She is resting comfortably in bed, no distress. Shortness of breath is very minimal.  She denies significant cough or any chest pain. Her overall generalized pain is not much change. She continues on PCA pump. Examination:  Vitals: BP (!) 136/95   Pulse 92   Temp 98.4 °F (36.9 °C) (Oral)   Resp 22   Ht 5' 7\" (1.702 m)   Wt 176 lb 1.6 oz (79.9 kg)   SpO2 100%   BMI 27.58 kg/m²   General appearance: alert and cooperative with exam, resting comfortably in bed  Neck: No JVD  Lungs: Moderate air exchange, no crackles or wheezing  Heart: regular rate and rhythm, S1, S2 normal, no gallop  Abdomen: Soft, non tender, + BS  Extremities: no cyanosis or clubbing. No significant edema    LABs:  CBC:   Recent Labs     10/14/21  0601 10/15/21  0530   WBC 21.9* 16.8*   HGB 7.8* 7.6*   HCT 22.0* 21.6*    305     BMP:   Recent Labs     10/14/21  0601 10/15/21  0530   * 134*   K 3.6* 3.7   CO2 19* 21   BUN 9 4*   CREATININE <0.40* <0.40*   LABGLOM CANNOT BE CALCULATED CANNOT BE CALCULATED   GLUCOSE 101* 111*     LIVER PROFILE:  Recent Labs     10/13/21  1310 10/14/21  0601   AST 64* 56*   ALT 43* 40*   LABALBU 3.6 3.1*     Radiology:  10/15/2021 x-ray chest: Cardiomegaly with bilateral mid and lower lung infiltrates consistent with pneumonia      Impression:  · Acute hypoxic respiratory insufficiency  · Extensive groundglass infiltrates right lung, present since 12/28/2020 CT chest from [address],  ?   Secondary to sickle cell disease versus atypical pneumonia, SARS-COV-2 -x2   · Small to moderate right pleural effusion  · Active smoking  · Sickle cell crisis/anemia  · SVC and right heart dilation on CT chest compatible with moderate PAH  · Hyponatremia  · Elevated LFTs    Recommendations:  · Continue oxygen via nasal cannula, keep SPO2 92% or greater  · Incentive spirometry every hour while awake  · Symbicort  · Albuterol HFA every 4 hours as needed  · Continue cefepime and Zithromax  · Continue IV fluids  · Pain control with PCA per others  · Hematology oncology following  · GI following. Monitor LFTs.   · Echo: Pending  · Labs: CBC and BMP in am  · DVT prophylaxis, EPC's  · PUD prophylaxis, Protonix  · Patient to discuss with RN/Dr. Yen Webb [facility]  · Will follow with you      Electronically signed by     Maddie Wills MD on 10/15/2021 at 3:41 PM  Pulmonary Critical Care and Sleep Medicine,  [facility]  Cell: [phone]  Office: [phone]

## 2021-10-15 NOTE — PROGRESS NOTES
Today's Date: 10/15/2021  Patient Name: Haley Ramirez  Date of admission: 10/13/2021 12:37 PM  Patient's age: 42 y.o., 1975  Admission Dx: Sickle cell crisis ([address] [D57.00]  Pneumonia [J18.9]  Pneumonia of both lungs due to infectious organism, unspecified part of lung [J18.9]  Anemia, unspecified type [D64.9]    Reason for Consult: management recommendations  Requesting Physician: Seema Lee MD    CHIEF COMPLAINT:  SICKLE CELL CRISIS, PNEUMONIA     INTERIM HISTORY  The patient is seen and evaluated. Continues to have severe pain although her pain is slowly getting better. Pulmonary input appreciated. Single colonic findings and chronic from December 2020 likely   a sequelae of recurrent sickle cell crisis or chemotherapy. Hypertension  Covid testing was negative    HISTORY OF PRESENT ILLNESS:      The patient is a 42 y.o.  female who is admitted to the hospital for leg pain and arm pain. 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 The patient  follows with a hematologist at the [address] [place] [address] system at this time could not be admitted to the hospital that he follows. I discussed the case with Dr. Teja Harris. Who is familiar with her case. She has frequent admissions to the hospital and mostly because of sickle cell crisis. Upon admission, the patient underwent CT scan that showed groundglass appearance and pleural effusion. She was technically diagnosed with pneumonia however, she had a CT scan done at [address] [place] [address] in August that showed similar findings so I think these are chronic lung findings. Patient is in extreme pain. She is quite upset and demanding specific dosing her medications. She is demanding PCA. Her hemoglobin was under 7 so she received 1 unit of blood. She has a previous history of iron overload and required phlebotomy. 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   She is not taking hydroxyurea or iron chelation    Past Medical History:   has a past medical history of Anxiety and Sickle cell anemia ([facility] Past Surgical History:   has a past surgical history that includes Cholecystectomy. Medications:    Reviewed in Epic     Allergies:  Lorazepam and Iodides    Social History:   reports that she has been smoking cigarettes. She has been smoking about 0.50 packs per day. She has never used smokeless tobacco. She reports current alcohol use. She reports current drug use. Drug: Opiates . Family History: family history is not on file. REVIEW OF SYSTEMS:    Constitutional: No fever or chills. Severe pain in her right arm and right leg  Eyes: No eye discharge, double vision, or eye pain   HEENT: negative for sore mouth, sore throat, hoarseness and voice change   Respiratory: She has chest discomfort, cough but no hemoptysis, no significant dyspnea at rest   Cardiovascular: negative for chest pain, dyspnea, palpitations, orthopnea, PND   Gastrointestinal: negative for nausea, vomiting, diarrhea, constipation, abdominal pain, Dysphagia, hematemesis and hematochezia   Genitourinary: negative for frequency, dysuria, nocturia, urinary incontinence, and hematuria   Integument: negative for rash, skin lesions, bruises.    Hematologic/Lymphatic: negative for easy bruising, bleeding, lymphadenopathy, or petechiae   Endocrine: negative for heat or cold intolerance,weight changes, change in bowel habits and hair loss   Musculoskeletal: Severe pain in the right arm and leg  Neurological: negative for headaches, dizziness, seizures, weakness, numbness    PHYSICAL EXAM:      /83   Pulse 93   Temp 99 °F (37.2 °C)   Resp 16   Ht 5' 7\" (1.702 m)   Wt 176 lb 1.6 oz (79.9 kg)   SpO2 98%   BMI 27.58 kg/m²    Temp (24hrs), Av.2 °F (36.8 °C), Min:97.2 °F (36.2 °C), Max:99.3 °F (37.4 °C)    General appearance - well appearing, in significant pain as discussed  Mental status - alert and cooperative   Eyes - pupils equal and reactive, extraocular eye movements intact   Ears - bilateral TM's and external ear canals normal   Mouth - mucous membranes moist, pharynx normal without lesions   Neck - supple, no significant adenopathy   Lymphatics - no palpable lymphadenopathy, no hepatosplenomegaly   Chest - clear to auscultation, no wheezes, rales or rhonchi, symmetric air entry   Heart - normal rate, regular rhythm, normal S1, S2, no murmurs  Abdomen - soft, nontender, nondistended, no masses or organomegaly   Neurological - alert, oriented, normal speech, no focal findings or movement disorder noted   Musculoskeletal - no joint tenderness, deformity or swelling   Extremities -exam limited by her severe pain. But I do not see any visible abnormality in her arms or legs  Skin - normal coloration and turgor, no rashes, no suspicious skin lesions noted ,    DATA:    Labs:   CBC:   Recent Labs     10/14/21  0601 10/15/21  0530   WBC 21.9* 16.8*   HGB 7.8* 7.6*   HCT 22.0* 21.6*    305     BMP:   Recent Labs     10/14/21  0601 10/15/21  0530   * 134*   K 3.6* 3.7   CO2 19* 21   BUN 9 4*   CREATININE <0.40* <0.40*   LABGLOM CANNOT BE CALCULATED CANNOT BE CALCULATED   GLUCOSE 101* 111*     PT/INR: No results for input(s): PROTIME, INR in the last 72 hours. IMAGING DATA:      Primary Problem  <principal problem not specified>    Active Hospital Problems    Diagnosis Date Noted    Elevated LFTs [R79.89]     Anemia [D64.9]     Sickle cell crisis ([facility] [D57.00]     Pneumonia [J18.9] 10/13/2021         IMPRESSION:   1. Hemoglobin SC disease  2. Transfusion related iron overload  3. Sickle cell crisis  4. Chronic lung findings leading to chronic respiratory failure  5. Chronic groundglass appearance of the lung  6. Pulmonary arterial hypertension  7. Hyponatremia, improved    RECOMMENDATIONS:  1. Appreciate current therapy by primary team, and pulmonary service  2. For pain control, she is on MS Contin and PCA with Dilaudid.   3. She is definitely getting better. I will continue the current dosing unless her pain worsens  4. Agree with transfusion to try to keep hemoglobin around 7    5. Watch hemoglobin and transfuse as needed  6. Continue supportive care  7. Will be available for any questions      Discussed with patient and Nurse. Thank you for asking us to see this patient.     Sofie Strickland MD  Hematologist/Medical Oncologist  Cell: [phone]

## 2021-10-15 NOTE — PROGRESS NOTES
Subjective:    Sickle cell crisis  Still complaining of pain upper and lower extremities stating she cannot bear weight on the legs and that they are weak unable to move around  ROS  No fever no chills no GI/ complaints no cardiac complaints no pulmonary complaints at present no TIA no bleeding no headache no sore throat no skin lesions no polyuria no polydipsia no  physical exam  General Appearance: in no acute distress and alert  Skin: warm and dry, no rash or erythema  Head: normocephalic and atraumatic  Eyes: Positive pallorNeck: neck supple and non tender without mass   Pulmonary/Chest: clear to auscultation bilaterally- no wheezes, rales or rhonchi, normal air movement, no respiratory distress  Cardiovascular: normal rate, regular rhythm, normal S1 and S2, no gallops, intact distal pulses and no carotid bruits  Abdomen: soft, non-tender, non-distended, normal bowel sounds, no masses or organomegaly  Extremities: no edema and good pulses no Homans' sign  Neurologic: Alert oriented x3 with no focal deficit    /89   Pulse 93   Temp 98.6 °F (37 °C)   Resp 18   Ht 5' 7\" (1.702 m)   Wt 176 lb 1.6 oz (79.9 kg)   SpO2 100%   BMI 27.58 kg/m²     CBC:   Lab Results   Component Value Date    WBC 16.8 10/15/2021    RBC 2.59 10/15/2021    HGB 7.6 10/15/2021    HCT 21.6 10/15/2021    MCV 83.4 10/15/2021    MCH 29.3 10/15/2021    MCHC 35.2 10/15/2021    RDW 19.7 10/15/2021     10/15/2021    MPV 10.5 10/15/2021     BMP:    Lab Results   Component Value Date     10/15/2021    K 3.7 10/15/2021     10/15/2021    CO2 21 10/15/2021    BUN 4 10/15/2021    LABALBU 3.2 10/15/2021    CREATININE <0.40 10/15/2021    CALCIUM 7.8 10/15/2021    GFRAA CANNOT BE CALCULATED 10/15/2021    LABGLOM CANNOT BE CALCULATED 10/15/2021    GLUCOSE 111 10/15/2021        Assessment:  Patient Active Problem List   Diagnosis    Pneumonia    Elevated LFTs    Anemia    Sickle cell crisis (HCC)     Sickle cell crisis  Anemia  Leukocytosis  Elevated LFTs  Hyponatremia  Chronic smoker  Pneumonia versus chronic lung disease  Leg pain and ability to ambulate plan:    Meds labs reviewed continue with IV fluids pain medications EPC cuffs will transfuse if needed to keep hemoglobin above 7 we will get x-rays of both knees both hips we will have physical therapy occupational therapy see the patient will ask neurology to see see orders      Mary Melendrez MD MD  1:20 PM

## 2021-10-16 VITALS
SYSTOLIC BLOOD PRESSURE: 141 MMHG | BODY MASS INDEX: 27.12 KG/M2 | DIASTOLIC BLOOD PRESSURE: 88 MMHG | OXYGEN SATURATION: 95 % | RESPIRATION RATE: 24 BRPM | HEART RATE: 104 BPM | TEMPERATURE: 97.3 F | WEIGHT: 172.8 LBS | HEIGHT: 67 IN

## 2021-10-16 LAB
ABSOLUTE EOS #: 0.14 K/UL (ref 0–0.4)
ABSOLUTE IMMATURE GRANULOCYTE: 0 K/UL (ref 0–0.3)
ABSOLUTE LYMPH #: 2.02 K/UL (ref 1–4.8)
ABSOLUTE MONO #: 1.3 K/UL (ref 0.2–0.8)
ANION GAP SERPL CALCULATED.3IONS-SCNC: 14 MMOL/L (ref 9–17)
BASOPHILS # BLD: 0 %
BASOPHILS ABSOLUTE: 0 K/UL (ref 0–0.2)
BUN BLDV-MCNC: 3 MG/DL (ref 6–20)
BUN/CREAT BLD: ABNORMAL (ref 9–20)
CALCIUM SERPL-MCNC: 8.5 MG/DL (ref 8.6–10.4)
CHLORIDE BLD-SCNC: 95 MMOL/L (ref 98–107)
CO2: 22 MMOL/L (ref 20–31)
CREAT SERPL-MCNC: <0.4 MG/DL (ref 0.5–0.9)
DIFFERENTIAL TYPE: ABNORMAL
EOSINOPHILS RELATIVE PERCENT: 1 % (ref 1–4)
GFR AFRICAN AMERICAN: ABNORMAL ML/MIN
GFR NON-AFRICAN AMERICAN: ABNORMAL ML/MIN
GFR SERPL CREATININE-BSD FRML MDRD: ABNORMAL ML/MIN/{1.73_M2}
GFR SERPL CREATININE-BSD FRML MDRD: ABNORMAL ML/MIN/{1.73_M2}
GLUCOSE BLD-MCNC: 91 MG/DL (ref 70–99)
HCT VFR BLD CALC: 24.1 % (ref 36.3–47.1)
HEMOGLOBIN: 8.1 G/DL (ref 11.9–15.1)
IMMATURE GRANULOCYTES: 0 %
LIVER-KIDNEY MICROSOMAL AB: NORMAL
LYMPHOCYTES # BLD: 14 % (ref 24–44)
MCH RBC QN AUTO: 28.9 PG (ref 25.2–33.5)
MCHC RBC AUTO-ENTMCNC: 33.6 G/DL (ref 28.4–34.8)
MCV RBC AUTO: 86.1 FL (ref 82.6–102.9)
MONOCYTES # BLD: 9 % (ref 1–7)
MORPHOLOGY: ABNORMAL
NRBC AUTOMATED: 1.3 PER 100 WBC
NUCLEATED RED BLOOD CELLS: 67 PER 100 WBC
PDW BLD-RTO: 19.8 % (ref 11.8–14.4)
PLATELET # BLD: 391 K/UL (ref 138–453)
PLATELET ESTIMATE: ABNORMAL
PMV BLD AUTO: 10.3 FL (ref 8.1–13.5)
POTASSIUM SERPL-SCNC: 2.9 MMOL/L (ref 3.7–5.3)
RBC # BLD: 2.8 M/UL (ref 3.95–5.11)
RBC # BLD: ABNORMAL 10*6/UL
SEG NEUTROPHILS: 76 % (ref 36–66)
SEGMENTED NEUTROPHILS ABSOLUTE COUNT: 10.94 K/UL (ref 1.8–7.7)
SMOOTH MUSCLE ANTIBODY: 5 UNITS (ref 0–19)
SODIUM BLD-SCNC: 131 MMOL/L (ref 135–144)
WBC # BLD: 14.4 K/UL (ref 3.5–11.3)
WBC # BLD: ABNORMAL 10*3/UL

## 2021-10-16 PROCEDURE — 6370000000 HC RX 637 (ALT 250 FOR IP): Performed by: INTERNAL MEDICINE

## 2021-10-16 PROCEDURE — 2700000000 HC OXYGEN THERAPY PER DAY

## 2021-10-16 PROCEDURE — 99232 SBSQ HOSP IP/OBS MODERATE 35: CPT | Performed by: INTERNAL MEDICINE

## 2021-10-16 PROCEDURE — 36415 COLL VENOUS BLD VENIPUNCTURE: CPT

## 2021-10-16 PROCEDURE — 6370000000 HC RX 637 (ALT 250 FOR IP): Performed by: PSYCHIATRY & NEUROLOGY

## 2021-10-16 PROCEDURE — 80048 BASIC METABOLIC PNL TOTAL CA: CPT

## 2021-10-16 PROCEDURE — 6360000002 HC RX W HCPCS: Performed by: INTERNAL MEDICINE

## 2021-10-16 PROCEDURE — 2580000003 HC RX 258: Performed by: INTERNAL MEDICINE

## 2021-10-16 PROCEDURE — 2580000003 HC RX 258: Performed by: NURSE PRACTITIONER

## 2021-10-16 PROCEDURE — 99253 IP/OBS CNSLTJ NEW/EST LOW 45: CPT | Performed by: PSYCHIATRY & NEUROLOGY

## 2021-10-16 PROCEDURE — 6360000002 HC RX W HCPCS: Performed by: NURSE PRACTITIONER

## 2021-10-16 PROCEDURE — C9113 INJ PANTOPRAZOLE SODIUM, VIA: HCPCS | Performed by: INTERNAL MEDICINE

## 2021-10-16 PROCEDURE — 94640 AIRWAY INHALATION TREATMENT: CPT

## 2021-10-16 PROCEDURE — 94761 N-INVAS EAR/PLS OXIMETRY MLT: CPT

## 2021-10-16 PROCEDURE — 85025 COMPLETE CBC W/AUTO DIFF WBC: CPT

## 2021-10-16 RX ORDER — BUDESONIDE AND FORMOTEROL FUMARATE DIHYDRATE 160; 4.5 UG/1; UG/1
2 AEROSOL RESPIRATORY (INHALATION) 2 TIMES DAILY
Qty: 10.2 G | Refills: 0 | Status: SHIPPED | OUTPATIENT
Start: 2021-10-17

## 2021-10-16 RX ORDER — SENNA PLUS 8.6 MG/1
2 TABLET ORAL NIGHTLY
Qty: 60 TABLET | Refills: 0 | Status: SHIPPED | OUTPATIENT
Start: 2021-10-16 | End: 2021-11-15

## 2021-10-16 RX ORDER — DIPHENHYDRAMINE HCL 25 MG
25 TABLET ORAL EVERY 6 HOURS PRN
Qty: 30 TABLET | Refills: 0 | Status: SHIPPED | OUTPATIENT
Start: 2021-10-16

## 2021-10-16 RX ORDER — DOCUSATE SODIUM 100 MG/1
100 CAPSULE, LIQUID FILLED ORAL 2 TIMES DAILY
Qty: 30 CAPSULE | Refills: 0 | Status: SHIPPED | OUTPATIENT
Start: 2021-10-16

## 2021-10-16 RX ORDER — SENNA PLUS 8.6 MG/1
2 TABLET ORAL NIGHTLY
Status: DISCONTINUED | OUTPATIENT
Start: 2021-10-16 | End: 2021-10-17 | Stop reason: HOSPADM

## 2021-10-16 RX ORDER — ROPINIROLE 0.5 MG/1
0.5 TABLET, FILM COATED ORAL NIGHTLY
Qty: 90 TABLET | Refills: 0 | Status: SHIPPED | OUTPATIENT
Start: 2021-10-16

## 2021-10-16 RX ORDER — ROPINIROLE 0.25 MG/1
0.5 TABLET, FILM COATED ORAL NIGHTLY
Status: DISCONTINUED | OUTPATIENT
Start: 2021-10-16 | End: 2021-10-17 | Stop reason: HOSPADM

## 2021-10-16 RX ORDER — ALBUTEROL SULFATE 90 UG/1
2 AEROSOL, METERED RESPIRATORY (INHALATION) EVERY 4 HOURS PRN
Qty: 18 G | Refills: 3 | Status: SHIPPED | OUTPATIENT
Start: 2021-10-16

## 2021-10-16 RX ORDER — POTASSIUM CHLORIDE 750 MG/1
40 CAPSULE, EXTENDED RELEASE ORAL ONCE
Status: COMPLETED | OUTPATIENT
Start: 2021-10-16 | End: 2021-10-16

## 2021-10-16 RX ORDER — PANTOPRAZOLE SODIUM 40 MG/10ML
40 INJECTION, POWDER, LYOPHILIZED, FOR SOLUTION INTRAVENOUS DAILY
Qty: 30 EACH | Refills: 0 | Status: SHIPPED | OUTPATIENT
Start: 2021-10-17

## 2021-10-16 RX ORDER — DOCUSATE SODIUM 100 MG/1
100 CAPSULE, LIQUID FILLED ORAL 2 TIMES DAILY
Status: DISCONTINUED | OUTPATIENT
Start: 2021-10-16 | End: 2021-10-17 | Stop reason: HOSPADM

## 2021-10-16 RX ADMIN — CLONAZEPAM 1 MG: 0.5 TABLET ORAL at 16:47

## 2021-10-16 RX ADMIN — Medication 12 MG: at 17:39

## 2021-10-16 RX ADMIN — DOCUSATE SODIUM 100 MG: 100 CAPSULE, LIQUID FILLED ORAL at 12:12

## 2021-10-16 RX ADMIN — Medication 12 MG: at 03:03

## 2021-10-16 RX ADMIN — MORPHINE SULFATE 30 MG: 15 TABLET, FILM COATED, EXTENDED RELEASE ORAL at 08:46

## 2021-10-16 RX ADMIN — AZITHROMYCIN MONOHYDRATE 500 MG: 500 INJECTION, POWDER, LYOPHILIZED, FOR SOLUTION INTRAVENOUS at 17:20

## 2021-10-16 RX ADMIN — Medication 12 MG: at 10:24

## 2021-10-16 RX ADMIN — DOCUSATE SODIUM 100 MG: 100 CAPSULE, LIQUID FILLED ORAL at 21:07

## 2021-10-16 RX ADMIN — BUDESONIDE AND FORMOTEROL FUMARATE DIHYDRATE 2 PUFF: 160; 4.5 AEROSOL RESPIRATORY (INHALATION) at 19:47

## 2021-10-16 RX ADMIN — BUDESONIDE AND FORMOTEROL FUMARATE DIHYDRATE 2 PUFF: 160; 4.5 AEROSOL RESPIRATORY (INHALATION) at 09:17

## 2021-10-16 RX ADMIN — PANTOPRAZOLE SODIUM 40 MG: 40 INJECTION, POWDER, FOR SOLUTION INTRAVENOUS at 08:46

## 2021-10-16 RX ADMIN — SODIUM CHLORIDE: 9 INJECTION, SOLUTION INTRAVENOUS at 06:07

## 2021-10-16 RX ADMIN — MORPHINE SULFATE 30 MG: 15 TABLET, FILM COATED, EXTENDED RELEASE ORAL at 21:07

## 2021-10-16 RX ADMIN — SODIUM CHLORIDE: 9 INJECTION, SOLUTION INTRAVENOUS at 00:26

## 2021-10-16 RX ADMIN — POTASSIUM CHLORIDE 40 MEQ: 10 CAPSULE, COATED, EXTENDED RELEASE ORAL at 21:54

## 2021-10-16 RX ADMIN — CEFEPIME HYDROCHLORIDE 2000 MG: 2 INJECTION, POWDER, FOR SOLUTION INTRAVENOUS at 18:37

## 2021-10-16 RX ADMIN — SENNOSIDES 17.2 MG: 8.6 TABLET, COATED ORAL at 21:07

## 2021-10-16 RX ADMIN — DIPHENHYDRAMINE HYDROCHLORIDE 25 MG: 25 TABLET ORAL at 08:46

## 2021-10-16 RX ADMIN — CLONAZEPAM 1 MG: 0.5 TABLET ORAL at 04:49

## 2021-10-16 RX ADMIN — CEFEPIME HYDROCHLORIDE 2000 MG: 2 INJECTION, POWDER, FOR SOLUTION INTRAVENOUS at 06:07

## 2021-10-16 RX ADMIN — DIPHENHYDRAMINE HYDROCHLORIDE 25 MG: 25 TABLET ORAL at 21:07

## 2021-10-16 RX ADMIN — ROPINIROLE HYDROCHLORIDE 0.5 MG: 0.25 TABLET, FILM COATED ORAL at 21:07

## 2021-10-16 ASSESSMENT — PAIN DESCRIPTION - LOCATION
LOCATION: GENERALIZED
LOCATION: GENERALIZED

## 2021-10-16 ASSESSMENT — PAIN DESCRIPTION - FREQUENCY
FREQUENCY: CONTINUOUS
FREQUENCY: CONTINUOUS

## 2021-10-16 ASSESSMENT — PAIN - FUNCTIONAL ASSESSMENT
PAIN_FUNCTIONAL_ASSESSMENT: PREVENTS OR INTERFERES SOME ACTIVE ACTIVITIES AND ADLS
PAIN_FUNCTIONAL_ASSESSMENT: PREVENTS OR INTERFERES SOME ACTIVE ACTIVITIES AND ADLS

## 2021-10-16 ASSESSMENT — PAIN SCALES - GENERAL
PAINLEVEL_OUTOF10: 10
PAINLEVEL_OUTOF10: 7
PAINLEVEL_OUTOF10: 10

## 2021-10-16 ASSESSMENT — PAIN DESCRIPTION - ONSET
ONSET: ON-GOING
ONSET: ON-GOING

## 2021-10-16 ASSESSMENT — PAIN DESCRIPTION - PROGRESSION
CLINICAL_PROGRESSION: NOT CHANGED
CLINICAL_PROGRESSION: NOT CHANGED

## 2021-10-16 ASSESSMENT — PAIN DESCRIPTION - PAIN TYPE
TYPE: CHRONIC PAIN
TYPE: CHRONIC PAIN

## 2021-10-16 ASSESSMENT — PAIN DESCRIPTION - ORIENTATION: ORIENTATION: RIGHT;LEFT;ANTERIOR;POSTERIOR

## 2021-10-16 ASSESSMENT — PAIN DESCRIPTION - DESCRIPTORS
DESCRIPTORS: ACHING;DISCOMFORT
DESCRIPTORS: ACHING;DISCOMFORT

## 2021-10-16 NOTE — PROGRESS NOTES
Today's Date: 10/16/2021  Patient Name: Hitesh Benitez  Date of admission: 10/13/2021 12:37 PM  Patient's age: 42 y.o., 1975  Admission Dx: Sickle cell crisis ([facility] [address] [D57.00]  Pneumonia [J18.9]  Pneumonia of both lungs due to infectious organism, unspecified part of lung [J18.9]  Anemia, unspecified type [D64.9]    Reason for Consult: management recommendations  Requesting Physician: Jessie Castillo MD    CHIEF COMPLAINT:  SICKLE CELL CRISIS, PNEUMONIA     INTERIM HISTORY  Patient was seen and examined. No events overnight. Continues to have body aches. Overall it is improving. Continues PCA drip. Covid testing was negative  Orthopedics consulted  to evaluate bone findings. HISTORY OF PRESENT ILLNESS:      The patient is a 42 y.o.  female who is admitted to the hospital for leg pain and arm pain. 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 The patient  follows with a hematologist at the [address] [place] [address] system at this time could not be admitted to the hospital that he follows. I discussed the case with Dr. Joaquin Sequeira. Who is familiar with her case. She has frequent admissions to the hospital and mostly because of sickle cell crisis. Upon admission, the patient underwent CT scan that showed groundglass appearance and pleural effusion. She was technically diagnosed with pneumonia however, she had a CT scan done at [address] [place] [address] in August that showed similar findings so I think these are chronic lung findings. Patient is in extreme pain. She is quite upset and demanding specific dosing her medications. She is demanding PCA. Her hemoglobin was under 7 so she received 1 unit of blood. She has a previous history of iron overload and required phlebotomy. 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 She is not taking hydroxyurea or iron chelation    Past Medical History:   has a past medical history of Anxiety and Sickle cell anemia ([facility] [address]     Past Surgical History: mucous membranes moist, pharynx normal without lesions   Neck - supple, no significant adenopathy   Lymphatics - no palpable lymphadenopathy, no hepatosplenomegaly   Chest - clear to auscultation, no wheezes, rales or rhonchi, symmetric air entry   Heart - normal rate, regular rhythm, normal S1, S2, no murmurs  Abdomen - soft, nontender, nondistended, no masses or organomegaly   Neurological - alert, oriented, normal speech, no focal findings or movement disorder noted   Musculoskeletal - no joint tenderness, deformity or swelling   Extremities -exam limited by her severe pain. But I do not see any visible abnormality in her arms or legs  Skin - normal coloration and turgor, no rashes, no suspicious skin lesions noted ,    DATA:    Labs:   CBC:   Recent Labs     10/15/21  0530 10/15/21  1423   WBC 16.8* 15.9*   HGB 7.6* 8.0*   HCT 21.6* 22.8*    306     BMP:   Recent Labs     10/14/21  0601 10/15/21  0530   * 134*   K 3.6* 3.7   CO2 19* 21   BUN 9 4*   CREATININE <0.40* <0.40*   LABGLOM CANNOT BE CALCULATED CANNOT BE CALCULATED   GLUCOSE 101* 111*     PT/INR: No results for input(s): PROTIME, INR in the last 72 hours. IMAGING DATA:      Primary Problem  <principal problem not specified>    Active Hospital Problems    Diagnosis Date Noted    Elevated LFTs [R79.89]     Anemia [D64.9]     Sickle cell crisis ([facility] [address] [D57.00]     Pneumonia [J18.9] 10/13/2021         IMPRESSION:   1. Hemoglobin SC disease  2. Transfusion related iron overload  3. Sickle cell crisis  4. Chronic lung findings leading to chronic respiratory failure  5. Chronic groundglass appearance of the lung  6. Pulmonary arterial hypertension  7. Hyponatremia, improved    RECOMMENDATIONS:  1. Clinically stable with no significant changes. 2. For pain control, she is on MS Contin and PCA with Dilaudid. 3. She is definitely getting better. 4. Labs were reviewed. No transfusion needed today.  Will transfuse to keep hemoglobin above seven.  5. Continue supportive care  6. Ortho consulted for bone findings evaluation. 7. Will be available for any questions                              [facility], [place], operated by [facility]                            This note is created with the assistance of a speech recognition program.  While intending to generate a document that actually reflects the content of the visit, the document can still have some errors including those of syntax and sound a like substitutions which may escape proof reading. It such instances, actual meaning can be extrapolated by contextual diversion.

## 2021-10-16 NOTE — PROGRESS NOTES
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  ROS  No fever no chills no GI/ complaints except constipation no cardiopulmonary complaints no TIA no bleeding no headache no sore throat no skin lesions no polyuria polydipsia  physical exam  General Appearance: in no acute distress and alert  Skin: warm and dry, no rash or erythema  Head: normocephalic and atraumatic  Eyes: Pale conjunctiva neck: neck supple and non tender without mass   Pulmonary/Chest: clear to auscultation bilaterally- no wheezes, rales or rhonchi, normal air movement, no respiratory distress  Cardiovascular: normal rate, regular rhythm, normal S1 and S2, no gallops, intact distal pulses and no carotid bruits  Abdomen: soft, non-tender, non-distended, normal bowel sounds, no masses or organomegaly  Extremities: no edema and pulses no Homans' sign  Neurologic: Alert oriented x3 no focal deficit    BP (!) 148/99   Pulse 93   Temp 97.7 °F (36.5 °C)   Resp 18   Ht 5' 7\" (1.702 m)   Wt 172 lb 12.8 oz (78.4 kg)   SpO2 100%   BMI 27.06 kg/m²     CBC:   Lab Results   Component Value Date    WBC 15.9 10/15/2021    RBC 2.73 10/15/2021    HGB 8.0 10/15/2021    HCT 22.8 10/15/2021    MCV 83.5 10/15/2021    MCH 29.3 10/15/2021    MCHC 35.1 10/15/2021    RDW 20.3 10/15/2021     10/15/2021    MPV 10.5 10/15/2021     BMP:    Lab Results   Component Value Date     10/15/2021    K 3.7 10/15/2021     10/15/2021    CO2 21 10/15/2021    BUN 4 10/15/2021    LABALBU 3.2 10/15/2021    CREATININE <0.40 10/15/2021    CALCIUM 7.8 10/15/2021    GFRAA CANNOT BE CALCULATED 10/15/2021    LABGLOM CANNOT BE CALCULATED 10/15/2021    GLUCOSE 111 10/15/2021        Assessment:  Patient Active Problem List   Diagnosis    Pneumonia    Elevated LFTs    Anemia    Sickle cell crisis (HCC)     Sickle cell crisis  Anemia  Leukocytosis  Elevated LFTs  Hyponatremia  Chronic smoker smoking cessation advised  Pneumonia versus chronic lung disease continue with antibiotics  Pain inability to ambulate , intramedullary bone infarct  Slow transit constipation  Plan:    Continue with IV fluids start Colace and Senokot continue with pain control physical therapy occupational therapy will ask orthopedic to see'   addendum hypok  40 meq micro k given    Loretta Peter MD MD  11:15 AM

## 2021-10-16 NOTE — CARE COORDINATION
Discharge planning    Patient chart reviewed. Call to net access to see if transfer was accepted. Spoke with Trang Gutierrez and was told transfer was placed on hold as [facility] was closed yesterday to any acute hospital transfers and will assess again today.

## 2021-10-16 NOTE — PROGRESS NOTES
Pulmonary Critical Care Progress Note  Laz August MD     Patient seen for the follow up of acute hypoxic respiratory insufficiency, extensive groundglass infiltrates right lung, right pleural effusion, active smoking, sickle cell crisis    Subjective:  No significant overnight events noted. Shortness of breath is mild in severity. She denies significant cough or any chest pain. She continues to have complaints of overall generalized pain, not much change. She also has restless type feeling in her legs. She remains on PCA pump, feels her pain is not well controlled. Examination:  Vitals: BP (!) 148/99   Pulse 93   Temp 97.7 °F (36.5 °C)   Resp 22   Ht 5' 7\" (1.702 m)   Wt 172 lb 12.8 oz (78.4 kg)   SpO2 100%   BMI 27.06 kg/m²   General appearance: alert and cooperative with exam, resting comfortably in bed  Neck: No JVD  Lungs: Moderate air exchange, no crackles or wheezing  Heart: regular rate and rhythm, S1, S2 normal, no gallop  Abdomen: Soft, non tender, + BS  Extremities: no cyanosis or clubbing. No significant edema    LABs:  CBC:   Recent Labs     10/15/21  0530 10/15/21  1423   WBC 16.8* 15.9*   HGB 7.6* 8.0*   HCT 21.6* 22.8*    306     BMP:   Recent Labs     10/14/21  0601 10/15/21  0530   * 134*   K 3.6* 3.7   CO2 19* 21   BUN 9 4*   CREATININE <0.40* <0.40*   LABGLOM CANNOT BE CALCULATED CANNOT BE CALCULATED   GLUCOSE 101* 111*     LIVER PROFILE:  Recent Labs     10/14/21  0601 10/15/21  0530   AST 56* 46*   ALT 40* 38*   LABALBU 3.1* 3.2*     Radiology:  10/15/2021 x-ray chest: Cardiomegaly with bilateral mid and lower lung infiltrates consistent with pneumonia      Impression:  · Acute hypoxic respiratory insufficiency  · Extensive groundglass infiltrates right lung, present since 12/28/2020 CT chest from [address],  ?   Secondary to sickle cell disease versus atypical pneumonia, SARS-COV-2 -x2   · Small to moderate right pleural effusion  · Active smoking  · Sickle cell crisis/anemia  · SVC and right heart dilation on CT chest compatible with moderate PAH  · Hyponatremia  · Elevated LFTs    Recommendations:  · Continue oxygen via nasal cannula, keep SPO2 92% or greater  · Incentive spirometry every hour while awake  · Symbicort  · Albuterol HFA every 4 hours as needed  · Continue cefepime and Zithromax  · Decrease IV fluid rate to 100 mL an hour  · Pain control with PCA per others  · Hematology oncology following  · GI following. Monitor LFTs.   · Echo: EF 70%, no pulmonary hypertension  · Labs: CBC and BMP in am  · DVT prophylaxis, EPC's  · PUD prophylaxis, Protonix  · Possible transfer to [facility] if bed becomes available  · Will follow with you      Electronically signed by     Lilibeth Fink MD on 10/16/2021 at 2:27 PM  Pulmonary Critical Care and Sleep Medicine,  [facility]  Cell: [phone]  Office: [phone]

## 2021-10-16 NOTE — PLAN OF CARE
Problem: Falls - Risk of:  Goal: Will remain free from falls  Description: Will remain free from falls  Outcome: Ongoing  Note: Siderails up x 2  Hourly rounding. Call light in reach. Instructed to call for assist before attempting out of bed. Remains free from falls and accidental injury at this time. Floor free from obstacles, and bed is locked and in lowest position. Adequate lighting provided. Bed alarm on. Fall sticker on wristband. Fall Sign posted in doorway      Problem: Falls - Risk of:  Goal: Absence of physical injury  Description: Absence of physical injury  Outcome: Ongoing     Problem: Pain:  Goal: Pain level will decrease  Description: Pain level will decrease  Outcome: Ongoing     Problem: Pain:  Goal: Control of acute pain  Description: Control of acute pain  Outcome: Ongoing  Note: Pain level assessed every four hours; patient reports chronic pain rated at a level of  10 out of 10 radiating to legs,generalized. Activities tried to relieve pain; heating pad, repositioning  Pain relief: none.              Problem: Pain:  Goal: Control of chronic pain  Description: Control of chronic pain  Outcome: Ongoing

## 2021-10-16 NOTE — PLAN OF CARE
Problem: Falls - Risk of:  Goal: Will remain free from falls  Description: Will remain free from falls  10/16/2021 0249 by Joi Rose RN  Outcome: Ongoing  10/15/2021 1807 by Janny Galicia RN  Outcome: Ongoing  Goal: Absence of physical injury  Description: Absence of physical injury  10/16/2021 0249 by Joi Rose RN  Outcome: Ongoing  10/15/2021 1807 by Janny Galicia RN  Outcome: Ongoing     Problem: Pain:  Goal: Pain level will decrease  Description: Pain level will decrease  10/16/2021 0249 by Joi Rose RN  Outcome: Ongoing  10/15/2021 1807 by Janny Galicia RN  Outcome: Ongoing  Goal: Control of acute pain  Description: Control of acute pain  10/16/2021 0249 by Joi Rose RN  Outcome: Ongoing  10/15/2021 1807 by Janny Galicia RN  Outcome: Ongoing  Goal: Control of chronic pain  Description: Control of chronic pain  10/16/2021 0249 by Joi Rose RN  Outcome: Ongoing  10/15/2021 1807 by Janny Galicia RN  Outcome: Ongoing     Problem: Airway Clearance - Ineffective:  Goal: Clear lung sounds  Description: Clear lung sounds  10/16/2021 0249 by Joi Rose RN  Outcome: Ongoing  10/15/2021 1807 by Janny Galicia RN  Outcome: Ongoing  Goal: Ability to maintain a clear airway will improve  Description: Ability to maintain a clear airway will improve  10/16/2021 0249 by Joi Rose RN  Outcome: Ongoing  10/15/2021 1807 by Janny Galicia RN  Outcome: Ongoing     Problem: Fluid Volume - Deficit:  Goal: Achieves intake and output within specified parameters  Description: Achieves intake and output within specified parameters  10/16/2021 0249 by Joi Rose RN  Outcome: Ongoing  10/15/2021 1807 by Janny Galicia RN  Outcome: Ongoing

## 2021-10-17 NOTE — PLAN OF CARE
RN received call from Laura. They have a bed for pt at [facility] at this time. RN confirmed with pt that she still wished to transfer. Access will notify Dr. Kiran Ocampo that pt will be transferring to [facility]. This RN will notify other services consulted that pt will be transferring to [facility].

## 2021-10-17 NOTE — DISCHARGE SUMMARY
Physician Discharge Summary     Patient ID:  Myrna Cole  [id number]  42 y.o.  1975    Admit date: 10/13/2021    Discharge date and time: 10/16/2021    Admission Diagnoses:   Patient Active Problem List   Diagnosis    Pneumonia    Elevated LFTs    Anemia    Sickle cell crisis ([facility] [address]       Discharge Diagnoses:   Sickle cell crisis  Anemia due to sickle cell  Leukocytosis  Elevated LFTs  Hyponatremia  Hypokalemia  Chronic smoker  Pneumonia  Slow transit constipation  Intramedullary bone infarct  Consults: pulmonary/intensive care, GI, hematology/oncology and neurology    Procedures: None    Hospital Course: 53-year-old gentlewoman admitted with sickle cell crisis with pain upper and lower extremities x-ray shows intramedullary bone infarcts orthopedic was consulted but she was transferred to [facility] before knowing that she was seen by pulmonary hematology oncology GI:, He was complaining of pain in spite of being on PCA pump and morphine she received IV fluids IV antibiotics and had EPC cuffs DVT prophylaxis during her stay here sodium was low which got better and she had low potassium which was supplemented with potassium per her wishes she wanted to be transferred to [facility] under the care of her oncologist and that was arranged  Discharge Exam:  See progress note from today    Disposition: To [facility]  Stable improved  Patient Instructions:   Discharge Medication List as of 10/16/2021  9:05 PM      START taking these medications    Details   albuterol sulfate  (90 Base) MCG/ACT inhaler Inhale 2 puffs into the lungs every 4 hours as needed for Wheezing, Disp-18 g, R-3Print      budesonide-formoterol (SYMBICORT) 160-4.5 MCG/ACT AERO Inhale 2 puffs into the lungs 2 times daily, Disp-10.2 g, R-0Print      diphenhydrAMINE (BENADRYL) 25 MG tablet Take 1 tablet by mouth every 6 hours as needed for Itching, Disp-30 tablet, R-0Print      rOPINIRole (REQUIP) 0.5 MG tablet Take 1 tablet by mouth nightly, Disp-90 tablet, R-0Print      docusate sodium (COLACE) 100 MG capsule Take 1 capsule by mouth 2 times daily, Disp-30 capsule, R-0Print      senna (SENOKOT) 8.6 MG tablet Take 2 tablets by mouth nightly, Disp-60 tablet, R-0Print      pantoprazole (PROTONIX) 40 MG injection Infuse 40 mg intravenously daily, Disp-30 each, R-0Print         CONTINUE these medications which have NOT CHANGED    Details   morphine (MS CONTIN) 30 MG extended release tablet Take 30 mg by mouth 2 times daily. Historical Med      ondansetron (ZOFRAN-ODT) 4 MG disintegrating tablet Take 4 mg by mouth every 8 hours as needed for Nausea or VomitingHistorical Med      naloxone 4 MG/0.1ML LIQD nasal spray 1 spray by Nasal route as needed for Opioid ReversalHistorical Med      clonazePAM (KLONOPIN) 1 MG tablet Take 1 tablet by mouth 2 times daily as needed for Anxiety. Historical Med         STOP taking these medications       oxyCODONE (ROXICODONE) 20 MG immediate release tablet Comments:   Reason for Stopping:         ibuprofen (ADVIL;MOTRIN) 600 MG tablet Comments:   Reason for Stopping:         oxyCODONE (OXYCONTIN) 20 MG extended release tablet Comments:   Reason for Stopping:         morphine 1 MG/ML SOLN injection Comments:   Reason for Stopping:         morphine (MSIR) 30 MG tablet Comments:   Reason for Stopping:         ondansetron (ZOFRAN) 4 MG tablet Comments:   Reason for Stopping:         diphenhydrAMINE (BENADRYL) 50 MG capsule Comments:   Reason for Stopping:             Activity: Up with assist  Diet: regular diet    She was transferred to [facility]  Signed:   Bekah Dubon MD MD  10/17/2021  11:32 AM

## 2021-10-17 NOTE — PROGRESS NOTES
Pt transferred via [employer] to [facility]. Report given to EMS. Pt discharged with all personal belongings. Report called to receiving RN at [facility].

## 2021-10-18 LAB
ALBUMIN (CALCULATED): 3.4 G/DL (ref 3.2–5.2)
ALBUMIN PERCENT: 56 % (ref 45–65)
ALPHA 1 PERCENT: 7 % (ref 3–6)
ALPHA 2 PERCENT: 11 % (ref 6–13)
ALPHA-1-GLOBULIN: 0.4 G/DL (ref 0.1–0.4)
ALPHA-2-GLOBULIN: 0.7 G/DL (ref 0.5–0.9)
BETA GLOBULIN: 0.8 G/DL (ref 0.5–1.1)
BETA PERCENT: 14 % (ref 11–19)
EBV EARLY ANTIGEN IGG: 30 U/ML
EBV INTERPRETATION: ABNORMAL
EBV NUCLEAR AG AB: 639 U/ML
EPSTEIN-BARR VCA IGG: 786 U/ML
EPSTEIN-BARR VCA IGM: 11 U/ML
GAMMA GLOBULIN %: 12 % (ref 9–20)
GAMMA GLOBULIN: 0.7 G/DL (ref 0.5–1.5)
PATHOLOGIST: ABNORMAL
PROTEIN ELECTROPHORESIS, SERUM: ABNORMAL
TOTAL PROT. SUM,%: 100 % (ref 98–102)
TOTAL PROT. SUM: 6 G/DL (ref 6.3–8.2)
TOTAL PROTEIN: 6 G/DL (ref 6.4–8.3)

## 2021-10-19 LAB
CULTURE: NORMAL
Lab: NORMAL
SPECIMEN DESCRIPTION: NORMAL

## 2021-10-20 LAB
CULTURE: NORMAL
Lab: NORMAL
SPECIMEN DESCRIPTION: NORMAL